# Patient Record
Sex: FEMALE | Race: WHITE | NOT HISPANIC OR LATINO | URBAN - METROPOLITAN AREA
[De-identification: names, ages, dates, MRNs, and addresses within clinical notes are randomized per-mention and may not be internally consistent; named-entity substitution may affect disease eponyms.]

---

## 2017-08-09 ENCOUNTER — INPATIENT (INPATIENT)
Facility: HOSPITAL | Age: 82
LOS: 7 days | Discharge: HOME HEALTH | DRG: 291 | End: 2017-08-17
Attending: INTERNAL MEDICINE | Admitting: EMERGENCY MEDICINE
Payer: MEDICARE

## 2017-08-09 VITALS
SYSTOLIC BLOOD PRESSURE: 150 MMHG | DIASTOLIC BLOOD PRESSURE: 70 MMHG | TEMPERATURE: 99 F | HEART RATE: 86 BPM | OXYGEN SATURATION: 98 % | RESPIRATION RATE: 17 BRPM

## 2017-08-09 DIAGNOSIS — E87.1 HYPO-OSMOLALITY AND HYPONATREMIA: ICD-10-CM

## 2017-08-09 PROCEDURE — 71010: CPT | Mod: 26

## 2017-08-09 PROCEDURE — 99285 EMERGENCY DEPT VISIT HI MDM: CPT

## 2017-08-09 RX ORDER — SULFASALAZINE 500 MG
500 TABLET ORAL
Qty: 0 | Refills: 0 | Status: DISCONTINUED | OUTPATIENT
Start: 2017-08-09 | End: 2017-08-17

## 2017-08-09 RX ORDER — BUDESONIDE, MICRONIZED 100 %
0.5 POWDER (GRAM) MISCELLANEOUS
Qty: 0 | Refills: 0 | Status: DISCONTINUED | OUTPATIENT
Start: 2017-08-09 | End: 2017-08-10

## 2017-08-09 RX ORDER — SIMVASTATIN 20 MG/1
20 TABLET, FILM COATED ORAL AT BEDTIME
Qty: 0 | Refills: 0 | Status: DISCONTINUED | OUTPATIENT
Start: 2017-08-09 | End: 2017-08-10

## 2017-08-09 RX ORDER — OXYCODONE HYDROCHLORIDE 5 MG/1
15 TABLET ORAL ONCE
Qty: 0 | Refills: 0 | Status: DISCONTINUED | OUTPATIENT
Start: 2017-08-09 | End: 2017-08-10

## 2017-08-09 RX ORDER — DORZOLAMIDE HYDROCHLORIDE TIMOLOL MALEATE 20; 5 MG/ML; MG/ML
1 SOLUTION/ DROPS OPHTHALMIC
Qty: 0 | Refills: 0 | Status: DISCONTINUED | OUTPATIENT
Start: 2017-08-09 | End: 2017-08-10

## 2017-08-09 RX ORDER — GABAPENTIN 400 MG/1
300 CAPSULE ORAL
Qty: 0 | Refills: 0 | Status: DISCONTINUED | OUTPATIENT
Start: 2017-08-09 | End: 2017-08-10

## 2017-08-09 RX ORDER — FUROSEMIDE 40 MG
20 TABLET ORAL ONCE
Qty: 0 | Refills: 0 | Status: DISCONTINUED | OUTPATIENT
Start: 2017-08-09 | End: 2017-08-10

## 2017-08-09 RX ORDER — METOPROLOL TARTRATE 50 MG
25 TABLET ORAL
Qty: 0 | Refills: 0 | Status: DISCONTINUED | OUTPATIENT
Start: 2017-08-09 | End: 2017-08-10

## 2017-08-09 RX ORDER — IPRATROPIUM/ALBUTEROL SULFATE 18-103MCG
3 AEROSOL WITH ADAPTER (GRAM) INHALATION ONCE
Qty: 0 | Refills: 0 | Status: DISCONTINUED | OUTPATIENT
Start: 2017-08-09 | End: 2017-08-10

## 2017-08-09 RX ORDER — FERROUS SULFATE 325(65) MG
325 TABLET ORAL DAILY
Qty: 0 | Refills: 0 | Status: DISCONTINUED | OUTPATIENT
Start: 2017-08-09 | End: 2017-08-10

## 2017-08-09 RX ORDER — LATANOPROST 0.05 MG/ML
1 SOLUTION/ DROPS OPHTHALMIC; TOPICAL AT BEDTIME
Qty: 0 | Refills: 0 | Status: DISCONTINUED | OUTPATIENT
Start: 2017-08-09 | End: 2017-08-17

## 2017-08-09 RX ORDER — ALPRAZOLAM 0.25 MG
0.12 TABLET ORAL EVERY 8 HOURS
Qty: 0 | Refills: 0 | Status: DISCONTINUED | OUTPATIENT
Start: 2017-08-09 | End: 2017-08-10

## 2017-08-09 RX ORDER — FOLIC ACID 0.8 MG
1 TABLET ORAL DAILY
Qty: 0 | Refills: 0 | Status: DISCONTINUED | OUTPATIENT
Start: 2017-08-09 | End: 2017-08-10

## 2017-08-09 RX ORDER — CITALOPRAM 10 MG/1
10 TABLET, FILM COATED ORAL DAILY
Qty: 0 | Refills: 0 | Status: DISCONTINUED | OUTPATIENT
Start: 2017-08-09 | End: 2017-08-10

## 2017-08-09 RX ORDER — LEVOTHYROXINE SODIUM 125 MCG
50 TABLET ORAL DAILY
Qty: 0 | Refills: 0 | Status: DISCONTINUED | OUTPATIENT
Start: 2017-08-09 | End: 2017-08-10

## 2017-08-09 RX ORDER — FUROSEMIDE 40 MG
20 TABLET ORAL
Qty: 0 | Refills: 0 | Status: DISCONTINUED | OUTPATIENT
Start: 2017-08-09 | End: 2017-08-10

## 2017-08-09 RX ORDER — BRIMONIDINE TARTRATE 2 MG/MG
1 SOLUTION/ DROPS OPHTHALMIC
Qty: 0 | Refills: 0 | Status: DISCONTINUED | OUTPATIENT
Start: 2017-08-09 | End: 2017-08-10

## 2017-08-09 RX ADMIN — Medication 20 MILLIGRAM(S): at 22:27

## 2017-08-09 NOTE — H&P ADULT - PMH
Arthritis    CHF (congestive heart failure)    Cholecystitis, acute    Crohn's Disease    Crohn's disease    Diabetes Mellitus    Diabetes mellitus    History of Spinal Stenosis    HTN (Hypertension)    Hypothyroidism    Pulmonary HTN    Total knee replacement status

## 2017-08-09 NOTE — H&P ADULT - HISTORY OF PRESENT ILLNESS
HPI::Patient is a 84y old  Female who presents with a chief complaint of hyponatremia/low platelets. no chest pain,+ sob/underwood/pnd/ortopnea  pt stopped her lasix few days ago. Seen by pmd was sent to er due to platelets of 50.no bleeding.      Chief complain platelets/sob    PAST MEDICAL & SURGICAL HISTORY:  CHF (congestive heart failure)  Pulmonary HTN  Cholecystitis, acute  Total knee replacement status  Hypothyroidism  Diabetes mellitus  Arthritis  Crohn's disease  Diabetes Mellitus  HTN (Hypertension)  History of Spinal Stenosis  Crohn's Disease  S/P cataract surgery: bilateral - R 2007 / L 2012  S/P cholecystectomy  H/O shoulder surgery: R rotator cuff repair  S/P Knee Replacement: 1985  Knee Replacement: right total knee replacement 2005      MEDICATIONS  (STANDING):    MEDICATIONS  (PRN):      FAMILY HISTORY:      SOCIAL HISTORY:    [ ] Non-smoker  [ ] Smoker  [ ] Alcohol    Allergies    No Known Allergies    Intolerances    	    REVIEW OF SYSTEMS:  CONSTITUTIONAL: No fever,+ weight loss,+ fatigue  EYES: No eye pain, visual disturbances, or discharge  ENT:  No difficulty hearing, tinnitus, vertigo; No sinus or throat pain  NECK: No pain or stiffness  RESPIRATORY: No cough, wheezing, chills or hemoptysis; + Shortness of Breath  CARDIOVASCULAR: No chest pain, palpitations, passing out, dizziness, or leg swelling  GASTROINTESTINAL: No abdominal or epigastric pain. No nausea, vomiting, or hematemesis; No diarrhea or constipation. No melena or hematochezia.  GENITOURINARY: No dysuria, frequency, hematuria, or incontinence  NEUROLOGICAL: No headaches, memory loss, loss of strength, numbness, or tremors  SKIN: No itching, burning, rashes, or lesions   LYMPH Nodes: No enlarged glands  ENDOCRINE: No heat or cold intolerance; No hair loss  MUSCULOSKELETAL: No joint pain or swelling; No muscle, back, or extremity pain  PSYCHIATRIC: No depression, anxiety, mood swings, or difficulty sleeping  HEME/LYMPH: No easy bruising, or bleeding gums  ALLERGY AND IMMUNOLOGIC: No hives or eczema	    [ ] All others negative	  [ ] Unable to obtain    PHYSICAL EXAM:  T(C): --  HR: --  BP: --  RR: --  SpO2: --  Wt(kg): --  I&O's Summary      Appearance: Normal	  HEENT:   Normal oral mucosa, PERRL, EOMI	  Lymphatic: No lymphadenopathy  Cardiovascular: Normal S1 S2, No JVD, Systolic murmurs, + edema  Respiratory: decrease bs b/l  Psychiatry: A & O x 3, Mood & affect appropriate  Gastrointestinal:  Soft, Non-tender, + BS	  Skin: No rashes, No ecchymoses, No cyanosis	  Neurologic: Non-focal  Extremities: Normal range of motion, No clubbing + edema  Vascular: Peripheral pulses palpable 2+ bilaterally    TELEMETRY: 	    ECG:  	  RADIOLOGY:  OTHER: 	  	  LABS:	 	    CARDIAC MARKERS:  CARDIAC MARKERS ( 09 Aug 2017 16:30 )  x     / <0.01 ng/mL / x     / x     / x           Xray Chest 1 View AP -PORTABLE-Routine (07.10.15 @ 16:56) >  Cardiomediastinal silhouette is unchanged.  No focal consolidation.  There are no pleural effusions.  There is no evidence of pneumothorax.    IMPRESSION: No focal consolidation.    5.7   )-----------( CLUMPED    ( 09 Aug 2017 16:26 )             38.6     08-09    124<L>  |  86<L>  |  8   ----------------------------<  119<H>  4.7   |  27  |  0.70    Ca    8.9      09 Aug 2017 16:30    TPro  6.8  /  Alb  3.6  /  TBili  0.5  /  DBili  x   /  AST  21  /  ALT  19  /  AlkPhos  82  08-09    proBNP: Serum Pro-Brain Natriuretic Peptide: 1987 pg/mL (08-09 @ 16:30)    Lipid Profile:   HgA1c:   TSH:      < from: 12 Lead ECG (06.12.15 @ 11:39) >  NORMAL SINUS RHYTHM  ST & T WAVE ABNORMALITY, CONSIDER INFERIOR ISCHEMIA  ST & T WAVE ABNORMALITY, CONSIDER ANTEROLATERAL ISCHEMIA  ABNORMAL ECG    < from: Transthoracic Echocardiogram (04.22.15 @ 08:03) >  1. Increased relative wall thickness with normal left  ventricular mass index, consistent with concentric left  ventricular remodeling.  2. Normal left ventricular systolic function. No segmental  wall motion abnormalities. Flattening of the  interventricular septum in both systole and diastole is  consistent with right ventricular pressure overload.  3. Decreased right ventricular systolic function.  Right  ventricular enlargement

## 2017-08-09 NOTE — H&P ADULT - ASSESSMENT
pt with low platelets now platelets with 100 with no bleeding hold asa  hold heparin  repeat cbc hem consult  R sided chf due to pulmonary htn   continue cardiac meds,.  repeat echo  iv lasix  hyponatremia due fluid overload since pt did take her lasix

## 2017-08-09 NOTE — H&P ADULT - PSH
H/O shoulder surgery  R rotator cuff repair  Knee Replacement  right total knee replacement 2005  S/P cataract surgery  bilateral - R 2007 / L 2012  S/P cholecystectomy    S/P Knee Replacement  1985

## 2017-08-10 ENCOUNTER — TRANSCRIPTION ENCOUNTER (OUTPATIENT)
Age: 82
End: 2017-08-10

## 2017-08-10 LAB
ALBUMIN SERPL ELPH-MCNC: 3.1 G/DL — LOW (ref 3.3–5)
ALP SERPL-CCNC: 75 U/L — SIGNIFICANT CHANGE UP (ref 40–120)
ALT FLD-CCNC: 18 U/L RC — SIGNIFICANT CHANGE UP (ref 10–45)
ANION GAP SERPL CALC-SCNC: 11 MMOL/L — SIGNIFICANT CHANGE UP (ref 5–17)
ANION GAP SERPL CALC-SCNC: 8 MMOL/L — SIGNIFICANT CHANGE UP (ref 5–17)
AST SERPL-CCNC: 24 U/L — SIGNIFICANT CHANGE UP (ref 10–40)
BILIRUB SERPL-MCNC: 0.4 MG/DL — SIGNIFICANT CHANGE UP (ref 0.2–1.2)
BUN SERPL-MCNC: 9 MG/DL — SIGNIFICANT CHANGE UP (ref 7–23)
BUN SERPL-MCNC: 9 MG/DL — SIGNIFICANT CHANGE UP (ref 7–23)
CALCIUM SERPL-MCNC: 8.7 MG/DL — SIGNIFICANT CHANGE UP (ref 8.4–10.5)
CALCIUM SERPL-MCNC: 8.9 MG/DL — SIGNIFICANT CHANGE UP (ref 8.4–10.5)
CHLORIDE SERPL-SCNC: 85 MMOL/L — LOW (ref 96–108)
CHLORIDE SERPL-SCNC: 86 MMOL/L — LOW (ref 96–108)
CO2 SERPL-SCNC: 30 MMOL/L — SIGNIFICANT CHANGE UP (ref 22–31)
CO2 SERPL-SCNC: 33 MMOL/L — HIGH (ref 22–31)
CREAT SERPL-MCNC: 0.73 MG/DL — SIGNIFICANT CHANGE UP (ref 0.5–1.3)
CREAT SERPL-MCNC: 0.77 MG/DL — SIGNIFICANT CHANGE UP (ref 0.5–1.3)
GLUCOSE SERPL-MCNC: 115 MG/DL — HIGH (ref 70–99)
GLUCOSE SERPL-MCNC: 138 MG/DL — HIGH (ref 70–99)
HCT VFR BLD CALC: 37.1 % — SIGNIFICANT CHANGE UP (ref 34.5–45)
HGB BLD-MCNC: 12.9 G/DL — SIGNIFICANT CHANGE UP (ref 11.5–15.5)
MCHC RBC-ENTMCNC: 33.1 PG — SIGNIFICANT CHANGE UP (ref 27–34)
MCHC RBC-ENTMCNC: 34.9 GM/DL — SIGNIFICANT CHANGE UP (ref 32–36)
MCV RBC AUTO: 94.9 FL — SIGNIFICANT CHANGE UP (ref 80–100)
OSMOLALITY SERPL: 265 MOS/KG — LOW (ref 275–300)
PLATELET # BLD AUTO: 117 K/UL — LOW (ref 150–400)
POTASSIUM SERPL-MCNC: 4.1 MMOL/L — SIGNIFICANT CHANGE UP (ref 3.5–5.3)
POTASSIUM SERPL-MCNC: 4.6 MMOL/L — SIGNIFICANT CHANGE UP (ref 3.5–5.3)
POTASSIUM SERPL-SCNC: 4.1 MMOL/L — SIGNIFICANT CHANGE UP (ref 3.5–5.3)
POTASSIUM SERPL-SCNC: 4.6 MMOL/L — SIGNIFICANT CHANGE UP (ref 3.5–5.3)
PROT SERPL-MCNC: 6.3 G/DL — SIGNIFICANT CHANGE UP (ref 6–8.3)
RBC # BLD: 3.91 M/UL — SIGNIFICANT CHANGE UP (ref 3.8–5.2)
RBC # FLD: 11.7 % — SIGNIFICANT CHANGE UP (ref 10.3–14.5)
SODIUM SERPL-SCNC: 126 MMOL/L — LOW (ref 135–145)
SODIUM SERPL-SCNC: 127 MMOL/L — LOW (ref 135–145)
TSH SERPL-MCNC: 1.4 UIU/ML — SIGNIFICANT CHANGE UP (ref 0.27–4.2)
URATE SERPL-MCNC: 5.4 MG/DL — SIGNIFICANT CHANGE UP (ref 2.5–7)
WBC # BLD: 7.2 K/UL — SIGNIFICANT CHANGE UP (ref 3.8–10.5)
WBC # FLD AUTO: 7.2 K/UL — SIGNIFICANT CHANGE UP (ref 3.8–10.5)

## 2017-08-10 RX ORDER — FUROSEMIDE 40 MG
20 TABLET ORAL
Qty: 0 | Refills: 0 | Status: DISCONTINUED | OUTPATIENT
Start: 2017-08-10 | End: 2017-08-10

## 2017-08-10 RX ORDER — ALPRAZOLAM 0.25 MG
0.12 TABLET ORAL EVERY 8 HOURS
Qty: 0 | Refills: 0 | Status: DISCONTINUED | OUTPATIENT
Start: 2017-08-10 | End: 2017-08-10

## 2017-08-10 RX ORDER — FERROUS SULFATE 325(65) MG
325 TABLET ORAL DAILY
Qty: 0 | Refills: 0 | Status: DISCONTINUED | OUTPATIENT
Start: 2017-08-10 | End: 2017-08-17

## 2017-08-10 RX ORDER — GABAPENTIN 400 MG/1
300 CAPSULE ORAL
Qty: 0 | Refills: 0 | Status: DISCONTINUED | OUTPATIENT
Start: 2017-08-10 | End: 2017-08-14

## 2017-08-10 RX ORDER — BRIMONIDINE TARTRATE 2 MG/MG
1 SOLUTION/ DROPS OPHTHALMIC
Qty: 0 | Refills: 0 | Status: DISCONTINUED | OUTPATIENT
Start: 2017-08-10 | End: 2017-08-17

## 2017-08-10 RX ORDER — BUDESONIDE, MICRONIZED 100 %
0.5 POWDER (GRAM) MISCELLANEOUS
Qty: 0 | Refills: 0 | Status: DISCONTINUED | OUTPATIENT
Start: 2017-08-10 | End: 2017-08-17

## 2017-08-10 RX ORDER — ONDANSETRON 8 MG/1
4 TABLET, FILM COATED ORAL ONCE
Qty: 0 | Refills: 0 | Status: DISCONTINUED | OUTPATIENT
Start: 2017-08-10 | End: 2017-08-10

## 2017-08-10 RX ORDER — SIMVASTATIN 20 MG/1
20 TABLET, FILM COATED ORAL AT BEDTIME
Qty: 0 | Refills: 0 | Status: DISCONTINUED | OUTPATIENT
Start: 2017-08-10 | End: 2017-08-17

## 2017-08-10 RX ORDER — METOPROLOL TARTRATE 50 MG
25 TABLET ORAL
Qty: 0 | Refills: 0 | Status: DISCONTINUED | OUTPATIENT
Start: 2017-08-10 | End: 2017-08-17

## 2017-08-10 RX ORDER — HEPARIN SODIUM 5000 [USP'U]/ML
5000 INJECTION INTRAVENOUS; SUBCUTANEOUS EVERY 12 HOURS
Qty: 0 | Refills: 0 | Status: DISCONTINUED | OUTPATIENT
Start: 2017-08-10 | End: 2017-08-17

## 2017-08-10 RX ORDER — FUROSEMIDE 40 MG
20 TABLET ORAL ONCE
Qty: 0 | Refills: 0 | Status: COMPLETED | OUTPATIENT
Start: 2017-08-10 | End: 2017-08-09

## 2017-08-10 RX ORDER — DILTIAZEM HCL 120 MG
120 CAPSULE, EXT RELEASE 24 HR ORAL DAILY
Qty: 0 | Refills: 0 | Status: DISCONTINUED | OUTPATIENT
Start: 2017-08-10 | End: 2017-08-13

## 2017-08-10 RX ORDER — IPRATROPIUM/ALBUTEROL SULFATE 18-103MCG
3 AEROSOL WITH ADAPTER (GRAM) INHALATION ONCE
Qty: 0 | Refills: 0 | Status: DISCONTINUED | OUTPATIENT
Start: 2017-08-10 | End: 2017-08-17

## 2017-08-10 RX ORDER — LEVOTHYROXINE SODIUM 125 MCG
50 TABLET ORAL DAILY
Qty: 0 | Refills: 0 | Status: DISCONTINUED | OUTPATIENT
Start: 2017-08-10 | End: 2017-08-17

## 2017-08-10 RX ORDER — FOLIC ACID 0.8 MG
1 TABLET ORAL DAILY
Qty: 0 | Refills: 0 | Status: DISCONTINUED | OUTPATIENT
Start: 2017-08-10 | End: 2017-08-17

## 2017-08-10 RX ORDER — DORZOLAMIDE HYDROCHLORIDE TIMOLOL MALEATE 20; 5 MG/ML; MG/ML
1 SOLUTION/ DROPS OPHTHALMIC
Qty: 0 | Refills: 0 | Status: DISCONTINUED | OUTPATIENT
Start: 2017-08-10 | End: 2017-08-17

## 2017-08-10 RX ORDER — CITALOPRAM 10 MG/1
10 TABLET, FILM COATED ORAL DAILY
Qty: 0 | Refills: 0 | Status: DISCONTINUED | OUTPATIENT
Start: 2017-08-10 | End: 2017-08-11

## 2017-08-10 RX ADMIN — OXYCODONE HYDROCHLORIDE 15 MILLIGRAM(S): 5 TABLET ORAL at 02:59

## 2017-08-10 RX ADMIN — DORZOLAMIDE HYDROCHLORIDE TIMOLOL MALEATE 1 DROP(S): 20; 5 SOLUTION/ DROPS OPHTHALMIC at 17:36

## 2017-08-10 RX ADMIN — HEPARIN SODIUM 5000 UNIT(S): 5000 INJECTION INTRAVENOUS; SUBCUTANEOUS at 17:36

## 2017-08-10 RX ADMIN — Medication 0.5 MILLIGRAM(S): at 17:36

## 2017-08-10 RX ADMIN — Medication 500 MILLIGRAM(S): at 06:14

## 2017-08-10 RX ADMIN — OXYCODONE HYDROCHLORIDE 15 MILLIGRAM(S): 5 TABLET ORAL at 02:28

## 2017-08-10 RX ADMIN — BRIMONIDINE TARTRATE 1 DROP(S): 2 SOLUTION/ DROPS OPHTHALMIC at 06:15

## 2017-08-10 RX ADMIN — Medication 50 MICROGRAM(S): at 06:14

## 2017-08-10 RX ADMIN — GABAPENTIN 300 MILLIGRAM(S): 400 CAPSULE ORAL at 06:14

## 2017-08-10 RX ADMIN — Medication 1 MILLIGRAM(S): at 12:42

## 2017-08-10 RX ADMIN — Medication 500 MILLIGRAM(S): at 17:37

## 2017-08-10 RX ADMIN — CITALOPRAM 10 MILLIGRAM(S): 10 TABLET, FILM COATED ORAL at 12:42

## 2017-08-10 RX ADMIN — LATANOPROST 1 DROP(S): 0.05 SOLUTION/ DROPS OPHTHALMIC; TOPICAL at 21:44

## 2017-08-10 RX ADMIN — Medication 20 MILLIGRAM(S): at 06:14

## 2017-08-10 RX ADMIN — Medication 325 MILLIGRAM(S): at 12:42

## 2017-08-10 RX ADMIN — Medication 25 MILLIGRAM(S): at 06:14

## 2017-08-10 RX ADMIN — Medication 0.5 MILLIGRAM(S): at 06:16

## 2017-08-10 RX ADMIN — BRIMONIDINE TARTRATE 1 DROP(S): 2 SOLUTION/ DROPS OPHTHALMIC at 17:36

## 2017-08-10 RX ADMIN — DORZOLAMIDE HYDROCHLORIDE TIMOLOL MALEATE 1 DROP(S): 20; 5 SOLUTION/ DROPS OPHTHALMIC at 06:15

## 2017-08-10 RX ADMIN — SIMVASTATIN 20 MILLIGRAM(S): 20 TABLET, FILM COATED ORAL at 21:43

## 2017-08-10 RX ADMIN — GABAPENTIN 300 MILLIGRAM(S): 400 CAPSULE ORAL at 17:36

## 2017-08-10 RX ADMIN — Medication 120 MILLIGRAM(S): at 12:42

## 2017-08-10 RX ADMIN — Medication 25 MILLIGRAM(S): at 17:36

## 2017-08-10 NOTE — DISCHARGE NOTE ADULT - MEDICATION SUMMARY - MEDICATIONS TO STOP TAKING
I will STOP taking the medications listed below when I get home from the hospital:    Cardizem  mg/24 hours oral capsule, extended release  -- 1 cap(s) by mouth once a day    VESIcare 10 mg oral tablet  -- 1 tab(s) by mouth once a day (at bedtime)    gabapentin 300 mg oral capsule  -- 1 cap(s) by mouth 2 times a day    citalopram 10 mg oral tablet  -- 1 tab(s) by mouth once a day

## 2017-08-10 NOTE — DISCHARGE NOTE ADULT - MEDICATION SUMMARY - MEDICATIONS TO TAKE
I will START or STAY ON the medications listed below when I get home from the hospital:    sulfaSALAzine 500 mg oral tablet  -- 1 tab(s) by mouth 2 times a day  -- Indication: For Crohn's disease    Pulmicort Respules 0.5 mg/2 mL inhalation suspension  -- 1 dose(s) inhaled 2 times a day  -- Indication: For Shortness of breath    aspirin 81 mg oral delayed release tablet  -- 1 tab(s) by mouth once a day  -- Indication: For protect from clot formation    oxyMORphone 10 mg oral tablet, extended release  -- 1 tab(s) by mouth every 12 hours  -- Indication: For chronic pain from spinal stensosis    simvastatin 20 mg oral tablet  -- 1 tab(s) by mouth once a day (at bedtime)  -- Indication: For Hgh cholesterol    Xanax  -- 0.125 milligram(s) by mouth every 8 hours, As Needed - for anxiety  -- Indication: For anxiety    metoprolol tartrate 25 mg oral tablet  -- 1 tab(s) by mouth 2 times a day  -- Indication: For High blood pressure, congestive heart failure    DuoNeb 0.5 mg-2.5 mg/3 mL inhalation solution  -- 3 milliliter(s) inhaled 4 times a day, As Needed  -- Indication: For Shortness of breath    furosemide 20 mg oral tablet  -- 1 tab(s) by mouth once a day  -- Indication: For congestive heart failure    ferrous sulfate (as elemental iron) 45 mg oral tablet, extended release  -- 1 tab(s) by mouth 2 times a day  -- Indication: For Supplement    Alphagan 0.2% ophthalmic solution  -- 1 drop(s) to each affected eye 2 times a day  -- Indication: For dry eyes    Cosopt  -- 1 drop(s) to each affected eye 2 times a day  -- Indication: For protect from increased eye pressure    Lumigan 0.01% ophthalmic solution  -- 1 drop(s) to each affected eye once a day (in the evening)  -- Indication: For protect from increased eye pressure    levothyroxine 50 mcg (0.05 mg) oral tablet  -- 1 tab(s) by mouth once a day  -- Indication: For Hypothyroidism    folic acid 1 mg oral tablet  -- 1 tab(s) by mouth once a day  -- Indication: For Supplement

## 2017-08-10 NOTE — DISCHARGE NOTE ADULT - NS AS DC HF EDUCATION INSTRUCTIONS
Low salt diet/Call Primary Care Provider for follow-up after discharge/Report weight gain of 2 or more pounds in one day or 3 or more pounds in one week, worsening shortness of breath, fatigue, weakness, increased swelling of hands and feet to primary care provider/Activities as tolerated/Monitor Weight Daily

## 2017-08-10 NOTE — DISCHARGE NOTE ADULT - NSFTFHOME1RD_GEN_ALL_CORE
Pain greater than 7 on scale of 10 on ambulation/Shortness of breath with minimal ambulation/Fall risk

## 2017-08-10 NOTE — DISCHARGE NOTE ADULT - NSFTFSERV1RD_GEN_ALL_CORE
medication teaching and assessment/rehabilitation services/observation and assessment/teaching and training

## 2017-08-10 NOTE — DISCHARGE NOTE ADULT - MEDICATION SUMMARY - MEDICATIONS TO CHANGE
I will SWITCH the dose or number of times a day I take the medications listed below when I get home from the hospital:    metoprolol  -- 12.5 milligram(s) by mouth every 12 hours    oxyCODONE 15 mg oral tablet  -- 1 tab(s) by mouth every 8 hours, As needed, moderate to severe pain    oxymorphone 20 mg oral tablet, extended release  -- 1 tab(s) by mouth every 12 hours

## 2017-08-10 NOTE — DISCHARGE NOTE ADULT - PATIENT PORTAL LINK FT
“You can access the [facility] Patient Portal, offered by [facility], by registering with the following website: [email or web address]”

## 2017-08-10 NOTE — DISCHARGE NOTE ADULT - HOME CARE AGENCY
[facility] # [phone]. VN TO CALL TO SCHEDULE VISITS DAY AFTER HOSPITAL DISCHARGE.

## 2017-08-10 NOTE — DISCHARGE NOTE ADULT - CARE PROVIDER_API CALL
Tony Potts), Critical Care Medicine; Internal Medicine; Pulmonary Disease; Sleep Medicine  [address]  [address]  [place] [address]  Phone: [phone]  Fax: [phone]    Rajendra Navas), Anesthesiology; Pain Medicine  [address]  [place] [address]  Phone: [phone]  Fax: [phone]    Fred Simmons), Gastroenterology; Internal Medicine  [address] [place] [address]  Phone: [phone]  Fax: [phone]

## 2017-08-10 NOTE — DISCHARGE NOTE ADULT - HOSPITAL COURSE
To be completed by attending. Pt non compliant with meds adm with vol overload and low sodium.  Followed and tx by renal.  Na stabilized.  Diuresed.  DC in stable condition.

## 2017-08-10 NOTE — DISCHARGE NOTE ADULT - PLAN OF CARE
Improved. Continue with oxygen support and respiratory treatments as prescribed by your doctor.  Follow-up with your primary care physician and pulmonologist in 1 week. Call for appointment. Follow-up with your primary care physician in 1 week . Call for appointment. Weigh yourself daily.  If you gain 3lbs in 3 days, or 5lbs in a week call your Health Care Provider.  Do not eat or drink foods containing more than 2000mg of salt (sodium) in your diet every day.  Call your Health Care Provider if you have any swelling or increased swelling in your feet, ankles, and/or stomach.  Take all of your medication as directed.  If you become dizzy call your Health Care Provider. You completed a course of antibiotics.   Drink enough water and fluids to keep your urine clear or pale yellow.  Avoid caffeine, tea, and carbonated beverages. They tend to irritate your bladder.  Empty your bladder often. Avoid holding urine for long periods of time.  After a bowel movement, women should cleanse from front to back. Use each tissue only once.  SEEK MEDICAL CARE IF:  You have back pain.  You develop a fever.  Your symptoms do not begin to resolve within 3 days.  SEEK IMMEDIATE MEDICAL CARE IF:  You have severe back pain or lower abdominal pain.  You develop chills.  You have nausea or vomiting.  You have continued burning or discomfort with urination. Continue with medication as prescribed by your doctor.  Follow-up with your primary care physician and gastroenterologist in 1 to 2 weeks. Call for appointment. you do not make enough thyroid hormone  signs & symptoms of low levels of thyroid hormone - tired, getting cold easily, coarse or thin hair, constipation, shortness of breath, swelling, irregular periods  your doctor will do thyroid hormone blood tests at least once a year to monitor if medication dose is adequate  take your thyroid medicine as directed by your doctor & on empty stomach Continue with eye drops as prescribed by your doctor.  Follow-up with your primary care physician and or ophthalmologist.

## 2017-08-10 NOTE — DISCHARGE NOTE ADULT - SECONDARY DIAGNOSIS.
Hyponatremia CHF (congestive heart failure) Urinary tract infection Crohn's disease Hypothyroidism S/P cataract surgery

## 2017-08-10 NOTE — DISCHARGE NOTE ADULT - ADDITIONAL INSTRUCTIONS
Follow-up with your primary care physician and  pulmonologist in 1 week. Call for appointment.  Follow-up with your Chronic Pain Physician in 1 week.

## 2017-08-10 NOTE — DISCHARGE NOTE ADULT - CARE PROVIDERS DIRECT ADDRESSES
,DirectAddress_Unknown,DirectAddress_Unknown,[email or web address]

## 2017-08-10 NOTE — DISCHARGE NOTE ADULT - CARE PLAN
Principal Discharge DX:	Shortness of breath  Secondary Diagnosis:	Hyponatremia  Secondary Diagnosis:	CHF (congestive heart failure)  Secondary Diagnosis:	Urinary tract infection  Secondary Diagnosis:	Crohn's disease Principal Discharge DX:	Shortness of breath  Goal:	Improved.  Instructions for follow-up, activity and diet:	Continue with oxygen support and respiratory treatments as prescribed by your doctor.  Follow-up with your primary care physician and pulmonologist in 1 week. Call for appointment.  Secondary Diagnosis:	Hyponatremia  Goal:	Improved.  Instructions for follow-up, activity and diet:	Follow-up with your primary care physician in 1 week . Call for appointment.  Secondary Diagnosis:	CHF (congestive heart failure)  Instructions for follow-up, activity and diet:	Weigh yourself daily.  If you gain 3lbs in 3 days, or 5lbs in a week call your Health Care Provider.  Do not eat or drink foods containing more than 2000mg of salt (sodium) in your diet every day.  Call your Health Care Provider if you have any swelling or increased swelling in your feet, ankles, and/or stomach.  Take all of your medication as directed.  If you become dizzy call your Health Care Provider.  Secondary Diagnosis:	Urinary tract infection  Instructions for follow-up, activity and diet:	You completed a course of antibiotics.   Drink enough water and fluids to keep your urine clear or pale yellow.  Avoid caffeine, tea, and carbonated beverages. They tend to irritate your bladder.  Empty your bladder often. Avoid holding urine for long periods of time.  After a bowel movement, women should cleanse from front to back. Use each tissue only once.  SEEK MEDICAL CARE IF:  You have back pain.  You develop a fever.  Your symptoms do not begin to resolve within 3 days.  SEEK IMMEDIATE MEDICAL CARE IF:  You have severe back pain or lower abdominal pain.  You develop chills.  You have nausea or vomiting.  You have continued burning or discomfort with urination.  Secondary Diagnosis:	Crohn's disease  Instructions for follow-up, activity and diet:	Continue with medication as prescribed by your doctor.  Follow-up with your primary care physician and gastroenterologist in 1 to 2 weeks. Call for appointment.  Secondary Diagnosis:	Hypothyroidism  Instructions for follow-up, activity and diet:	you do not make enough thyroid hormone  signs & symptoms of low levels of thyroid hormone - tired, getting cold easily, coarse or thin hair, constipation, shortness of breath, swelling, irregular periods  your doctor will do thyroid hormone blood tests at least once a year to monitor if medication dose is adequate  take your thyroid medicine as directed by your doctor & on empty stomach  Secondary Diagnosis:	S/P cataract surgery  Instructions for follow-up, activity and diet:	Continue with eye drops as prescribed by your doctor.  Follow-up with your primary care physician and or ophthalmologist.

## 2017-08-11 LAB
ANION GAP SERPL CALC-SCNC: 10 MMOL/L — SIGNIFICANT CHANGE UP (ref 5–17)
ANION GAP SERPL CALC-SCNC: 8 MMOL/L — SIGNIFICANT CHANGE UP (ref 5–17)
BUN SERPL-MCNC: 13 MG/DL — SIGNIFICANT CHANGE UP (ref 7–23)
BUN SERPL-MCNC: 15 MG/DL — SIGNIFICANT CHANGE UP (ref 7–23)
CALCIUM SERPL-MCNC: 8.8 MG/DL — SIGNIFICANT CHANGE UP (ref 8.4–10.5)
CALCIUM SERPL-MCNC: 9.3 MG/DL — SIGNIFICANT CHANGE UP (ref 8.4–10.5)
CHLORIDE SERPL-SCNC: 81 MMOL/L — LOW (ref 96–108)
CHLORIDE SERPL-SCNC: 83 MMOL/L — LOW (ref 96–108)
CHLORIDE UR-SCNC: 37 MMOL/L — SIGNIFICANT CHANGE UP
CHLORIDE UR-SCNC: 94 MMOL/L — SIGNIFICANT CHANGE UP
CO2 SERPL-SCNC: 32 MMOL/L — HIGH (ref 22–31)
CO2 SERPL-SCNC: 35 MMOL/L — HIGH (ref 22–31)
CREAT ?TM UR-MCNC: 45 MG/DL — SIGNIFICANT CHANGE UP
CREAT SERPL-MCNC: 1.06 MG/DL — SIGNIFICANT CHANGE UP (ref 0.5–1.3)
CREAT SERPL-MCNC: 1.13 MG/DL — SIGNIFICANT CHANGE UP (ref 0.5–1.3)
GLUCOSE SERPL-MCNC: 142 MG/DL — HIGH (ref 70–99)
GLUCOSE SERPL-MCNC: 97 MG/DL — SIGNIFICANT CHANGE UP (ref 70–99)
HBA1C BLD-MCNC: 5.7 % — HIGH (ref 4–5.6)
HCT VFR BLD CALC: 34.4 % — LOW (ref 34.5–45)
HGB BLD-MCNC: 11.8 G/DL — SIGNIFICANT CHANGE UP (ref 11.5–15.5)
MCHC RBC-ENTMCNC: 31.6 PG — SIGNIFICANT CHANGE UP (ref 27–34)
MCHC RBC-ENTMCNC: 34.3 GM/DL — SIGNIFICANT CHANGE UP (ref 32–36)
MCV RBC AUTO: 92 FL — SIGNIFICANT CHANGE UP (ref 80–100)
OSMOLALITY UR: 259 MOS/KG — SIGNIFICANT CHANGE UP (ref 50–1200)
OSMOLALITY UR: 367 MOS/KG — SIGNIFICANT CHANGE UP (ref 50–1200)
PLATELET # BLD AUTO: 106 K/UL — LOW (ref 150–400)
POTASSIUM SERPL-MCNC: 4.5 MMOL/L — SIGNIFICANT CHANGE UP (ref 3.5–5.3)
POTASSIUM SERPL-MCNC: 4.5 MMOL/L — SIGNIFICANT CHANGE UP (ref 3.5–5.3)
POTASSIUM SERPL-SCNC: 4.5 MMOL/L — SIGNIFICANT CHANGE UP (ref 3.5–5.3)
POTASSIUM SERPL-SCNC: 4.5 MMOL/L — SIGNIFICANT CHANGE UP (ref 3.5–5.3)
POTASSIUM UR-SCNC: 18 MMOL/L — SIGNIFICANT CHANGE UP
POTASSIUM UR-SCNC: 21 MMOL/L — SIGNIFICANT CHANGE UP
RBC # BLD: 3.74 M/UL — LOW (ref 3.8–5.2)
RBC # FLD: 13.2 % — SIGNIFICANT CHANGE UP (ref 10.3–14.5)
SODIUM SERPL-SCNC: 124 MMOL/L — LOW (ref 135–145)
SODIUM SERPL-SCNC: 125 MMOL/L — LOW (ref 135–145)
SODIUM UR-SCNC: 37 MMOL/L — SIGNIFICANT CHANGE UP
SODIUM UR-SCNC: 82 MMOL/L — SIGNIFICANT CHANGE UP
WBC # BLD: 6.41 K/UL — SIGNIFICANT CHANGE UP (ref 3.8–10.5)
WBC # FLD AUTO: 6.41 K/UL — SIGNIFICANT CHANGE UP (ref 3.8–10.5)

## 2017-08-11 RX ORDER — ACETAMINOPHEN 500 MG
650 TABLET ORAL ONCE
Qty: 0 | Refills: 0 | Status: COMPLETED | OUTPATIENT
Start: 2017-08-11 | End: 2017-08-11

## 2017-08-11 RX ORDER — OXYCODONE HYDROCHLORIDE 5 MG/1
5 TABLET ORAL ONCE
Qty: 0 | Refills: 0 | Status: DISCONTINUED | OUTPATIENT
Start: 2017-08-11 | End: 2017-08-11

## 2017-08-11 RX ORDER — SODIUM CHLORIDE 9 MG/ML
1 INJECTION INTRAMUSCULAR; INTRAVENOUS; SUBCUTANEOUS EVERY 4 HOURS
Qty: 0 | Refills: 0 | Status: COMPLETED | OUTPATIENT
Start: 2017-08-11 | End: 2017-08-11

## 2017-08-11 RX ADMIN — Medication 25 MILLIGRAM(S): at 05:56

## 2017-08-11 RX ADMIN — Medication 650 MILLIGRAM(S): at 21:41

## 2017-08-11 RX ADMIN — SIMVASTATIN 20 MILLIGRAM(S): 20 TABLET, FILM COATED ORAL at 21:16

## 2017-08-11 RX ADMIN — DORZOLAMIDE HYDROCHLORIDE TIMOLOL MALEATE 1 DROP(S): 20; 5 SOLUTION/ DROPS OPHTHALMIC at 05:57

## 2017-08-11 RX ADMIN — Medication 0.5 MILLIGRAM(S): at 05:58

## 2017-08-11 RX ADMIN — DORZOLAMIDE HYDROCHLORIDE TIMOLOL MALEATE 1 DROP(S): 20; 5 SOLUTION/ DROPS OPHTHALMIC at 17:05

## 2017-08-11 RX ADMIN — HEPARIN SODIUM 5000 UNIT(S): 5000 INJECTION INTRAVENOUS; SUBCUTANEOUS at 17:05

## 2017-08-11 RX ADMIN — BRIMONIDINE TARTRATE 1 DROP(S): 2 SOLUTION/ DROPS OPHTHALMIC at 05:57

## 2017-08-11 RX ADMIN — Medication 325 MILLIGRAM(S): at 12:47

## 2017-08-11 RX ADMIN — Medication 1 MILLIGRAM(S): at 12:48

## 2017-08-11 RX ADMIN — BRIMONIDINE TARTRATE 1 DROP(S): 2 SOLUTION/ DROPS OPHTHALMIC at 17:05

## 2017-08-11 RX ADMIN — Medication 650 MILLIGRAM(S): at 22:15

## 2017-08-11 RX ADMIN — Medication 500 MILLIGRAM(S): at 17:05

## 2017-08-11 RX ADMIN — Medication 500 MILLIGRAM(S): at 06:22

## 2017-08-11 RX ADMIN — Medication 50 MICROGRAM(S): at 05:56

## 2017-08-11 RX ADMIN — Medication 0.5 MILLIGRAM(S): at 17:05

## 2017-08-11 RX ADMIN — HEPARIN SODIUM 5000 UNIT(S): 5000 INJECTION INTRAVENOUS; SUBCUTANEOUS at 05:58

## 2017-08-11 RX ADMIN — GABAPENTIN 300 MILLIGRAM(S): 400 CAPSULE ORAL at 05:56

## 2017-08-11 RX ADMIN — SODIUM CHLORIDE 1 GRAM(S): 9 INJECTION INTRAMUSCULAR; INTRAVENOUS; SUBCUTANEOUS at 17:05

## 2017-08-11 RX ADMIN — LATANOPROST 1 DROP(S): 0.05 SOLUTION/ DROPS OPHTHALMIC; TOPICAL at 21:16

## 2017-08-11 RX ADMIN — SODIUM CHLORIDE 1 GRAM(S): 9 INJECTION INTRAMUSCULAR; INTRAVENOUS; SUBCUTANEOUS at 12:47

## 2017-08-11 RX ADMIN — GABAPENTIN 300 MILLIGRAM(S): 400 CAPSULE ORAL at 17:05

## 2017-08-11 RX ADMIN — Medication 25 MILLIGRAM(S): at 17:06

## 2017-08-11 RX ADMIN — Medication 120 MILLIGRAM(S): at 05:55

## 2017-08-11 NOTE — PROGRESS NOTE ADULT - ASSESSMENT
PT WITH  HTN, NOW  WITH HYPONATREMIA   SEEN BY RENAL   DUIRETIC    ON HOLD.  FLUID  RESTRICT    BMP IN AM

## 2017-08-11 NOTE — DIETITIAN INITIAL EVALUATION ADULT. - OTHER INFO
Nutrition consult received for CHF and HgbA1c pending. Pt reports good appetite and po intakes of meals, flowsheet reviewed. No GI distress but last BM 2 days ago, will provide stewed prunes and prune juice to help with BM. Pt missing upper and lower teeth with no dentures, but tolerating soft diet texture, denies chewing/swallowing difficulties. NKFA. PTA takes multivitamin. Pt states she following low sodium diet, but does not check daily wt.

## 2017-08-11 NOTE — DIETITIAN INITIAL EVALUATION ADULT. - NS AS NUTRI INTERV MEALS SNACK
Reviewed alternate menu options and menu ordering procedure. Provide food preferences within therapeutic diet when requested. RD to provide stewed prunes and prune juice.

## 2017-08-11 NOTE — DIETITIAN INITIAL EVALUATION ADULT. - PERTINENT LABORATORY DATA
Na 125 [135 - 145], K+ 4.5 [3.5 - 5.3], BUN 13 [7 - 23], Cr 1.06 [0.50 - 1.30], BG 97 [70 - 99], Phos --, Alk Phos --, AST --, ALT --, Mg --, Ca 8.8 [8.4 - 10.5], HbA1c --

## 2017-08-11 NOTE — PROGRESS NOTE ADULT - ASSESSMENT
85 yo F with hyponatremia with hx of chf as well  sodium still low despite lasix initiated  her serum bicarb is rising. pro bnp is less than 2000. cxr 8/9 without signs of chf  therefore hold lasix   sodium chloride 1 g bid and re-evaluate in am   dc SSRI  urine lytes and osoms   d/w cards team

## 2017-08-11 NOTE — DIETITIAN INITIAL EVALUATION ADULT. - ORAL INTAKE PTA
good/HHA usually prepares meals at home, states appetite and po intakes were good PTA, reports consuming vegetables, protein and starch for meals. Pt especially enjoys eating oatmeal.

## 2017-08-11 NOTE — DIETITIAN INITIAL EVALUATION ADULT. - ENERGY NEEDS
Height: 67 inches (stated), Weight: 174 pounds  BMI: 27kg/m2 IBW: 135 pounds (+/-10%), %IBW:129%  Pertinent Info: Per chart, 85 y/o female with PMH of T2DM (A1c pending), CHF, Crohn's disease, admitted with low platelet count and hyponatremia after stopping lasix at home for several days, s/p IV lasix. +1 bilateral legs edema, no pressure ulcers noted at this time.

## 2017-08-12 DIAGNOSIS — R40.0 SOMNOLENCE: ICD-10-CM

## 2017-08-12 DIAGNOSIS — E87.1 HYPO-OSMOLALITY AND HYPONATREMIA: ICD-10-CM

## 2017-08-12 LAB
ALBUMIN SERPL ELPH-MCNC: 3.5 G/DL — SIGNIFICANT CHANGE UP (ref 3.3–5)
ALP SERPL-CCNC: 85 U/L — SIGNIFICANT CHANGE UP (ref 40–120)
ALT FLD-CCNC: 15 U/L — SIGNIFICANT CHANGE UP (ref 10–45)
ANION GAP SERPL CALC-SCNC: 9 MMOL/L — SIGNIFICANT CHANGE UP (ref 5–17)
AST SERPL-CCNC: 23 U/L — SIGNIFICANT CHANGE UP (ref 10–40)
BILIRUB SERPL-MCNC: 0.3 MG/DL — SIGNIFICANT CHANGE UP (ref 0.2–1.2)
BUN SERPL-MCNC: 19 MG/DL — SIGNIFICANT CHANGE UP (ref 7–23)
CALCIUM SERPL-MCNC: 9.5 MG/DL — SIGNIFICANT CHANGE UP (ref 8.4–10.5)
CHLORIDE SERPL-SCNC: 84 MMOL/L — LOW (ref 96–108)
CO2 SERPL-SCNC: 32 MMOL/L — HIGH (ref 22–31)
CREAT SERPL-MCNC: 1.22 MG/DL — SIGNIFICANT CHANGE UP (ref 0.5–1.3)
GLUCOSE SERPL-MCNC: 109 MG/DL — HIGH (ref 70–99)
POTASSIUM SERPL-MCNC: 5 MMOL/L — SIGNIFICANT CHANGE UP (ref 3.5–5.3)
POTASSIUM SERPL-SCNC: 5 MMOL/L — SIGNIFICANT CHANGE UP (ref 3.5–5.3)
PROT SERPL-MCNC: 6.5 G/DL — SIGNIFICANT CHANGE UP (ref 6–8.3)
SODIUM SERPL-SCNC: 125 MMOL/L — LOW (ref 135–145)

## 2017-08-12 PROCEDURE — 70450 CT HEAD/BRAIN W/O DYE: CPT | Mod: 26

## 2017-08-12 RX ORDER — SODIUM CHLORIDE 9 MG/ML
1000 INJECTION INTRAMUSCULAR; INTRAVENOUS; SUBCUTANEOUS
Qty: 0 | Refills: 0 | Status: COMPLETED | OUTPATIENT
Start: 2017-08-12 | End: 2017-08-12

## 2017-08-12 RX ORDER — SODIUM CHLORIDE 9 MG/ML
1000 INJECTION INTRAMUSCULAR; INTRAVENOUS; SUBCUTANEOUS
Qty: 0 | Refills: 0 | Status: DISCONTINUED | OUTPATIENT
Start: 2017-08-12 | End: 2017-08-12

## 2017-08-12 RX ORDER — FUROSEMIDE 40 MG
20 TABLET ORAL ONCE
Qty: 0 | Refills: 0 | Status: DISCONTINUED | OUTPATIENT
Start: 2017-08-12 | End: 2017-08-12

## 2017-08-12 RX ADMIN — Medication 500 MILLIGRAM(S): at 18:40

## 2017-08-12 RX ADMIN — Medication 120 MILLIGRAM(S): at 05:27

## 2017-08-12 RX ADMIN — Medication 325 MILLIGRAM(S): at 12:39

## 2017-08-12 RX ADMIN — DORZOLAMIDE HYDROCHLORIDE TIMOLOL MALEATE 1 DROP(S): 20; 5 SOLUTION/ DROPS OPHTHALMIC at 18:41

## 2017-08-12 RX ADMIN — Medication 1 MILLIGRAM(S): at 12:39

## 2017-08-12 RX ADMIN — BRIMONIDINE TARTRATE 1 DROP(S): 2 SOLUTION/ DROPS OPHTHALMIC at 05:26

## 2017-08-12 RX ADMIN — BRIMONIDINE TARTRATE 1 DROP(S): 2 SOLUTION/ DROPS OPHTHALMIC at 18:41

## 2017-08-12 RX ADMIN — SODIUM CHLORIDE 50 MILLILITER(S): 9 INJECTION INTRAMUSCULAR; INTRAVENOUS; SUBCUTANEOUS at 22:09

## 2017-08-12 RX ADMIN — GABAPENTIN 300 MILLIGRAM(S): 400 CAPSULE ORAL at 05:27

## 2017-08-12 RX ADMIN — SIMVASTATIN 20 MILLIGRAM(S): 20 TABLET, FILM COATED ORAL at 22:09

## 2017-08-12 RX ADMIN — Medication 50 MICROGRAM(S): at 05:27

## 2017-08-12 RX ADMIN — Medication 25 MILLIGRAM(S): at 22:09

## 2017-08-12 RX ADMIN — HEPARIN SODIUM 5000 UNIT(S): 5000 INJECTION INTRAVENOUS; SUBCUTANEOUS at 05:27

## 2017-08-12 RX ADMIN — SODIUM CHLORIDE 50 MILLILITER(S): 9 INJECTION INTRAMUSCULAR; INTRAVENOUS; SUBCUTANEOUS at 18:51

## 2017-08-12 RX ADMIN — Medication 25 MILLIGRAM(S): at 08:19

## 2017-08-12 RX ADMIN — DORZOLAMIDE HYDROCHLORIDE TIMOLOL MALEATE 1 DROP(S): 20; 5 SOLUTION/ DROPS OPHTHALMIC at 05:26

## 2017-08-12 RX ADMIN — HEPARIN SODIUM 5000 UNIT(S): 5000 INJECTION INTRAVENOUS; SUBCUTANEOUS at 18:40

## 2017-08-12 RX ADMIN — LATANOPROST 1 DROP(S): 0.05 SOLUTION/ DROPS OPHTHALMIC; TOPICAL at 22:09

## 2017-08-12 RX ADMIN — Medication 0.5 MILLIGRAM(S): at 05:26

## 2017-08-12 RX ADMIN — Medication 0.5 MILLIGRAM(S): at 20:12

## 2017-08-12 RX ADMIN — Medication 500 MILLIGRAM(S): at 05:27

## 2017-08-12 NOTE — PROGRESS NOTE ADULT - PROBLEM SELECTOR PLAN 2
Consider check ABG for CO2 retention  can check BMP for acute worsening of hyponatremia  In process of writing this A/P, code stroke called for new onset weakness as per bedside aide  evolving LATISHA, but unlikely to be uremic encephalopathy

## 2017-08-12 NOTE — PHYSICAL THERAPY INITIAL EVALUATION ADULT - PLANNED THERAPY INTERVENTIONS, PT EVAL
bed mobility training/strengthening/stair assessment/training/gait training/transfer training/balance training

## 2017-08-12 NOTE — PHYSICAL THERAPY INITIAL EVALUATION ADULT - IMPAIRMENTS CONTRIBUTING TO GAIT DEVIATIONS, PT EVAL
impaired postural control/impaired balance/decreased strength/narrow base of support/Pt with multiple episodes of ganga knee buckling in standing however, Le's did not give out on pt

## 2017-08-12 NOTE — PHYSICAL THERAPY INITIAL EVALUATION ADULT - TRANSFER SAFETY CONCERNS NOTED: SIT/STAND, REHAB EVAL
sit<-->stand x 2 reps. Pt anxious with standing, LE's sliding beneath pt/decreased weight-shifting ability/decreased safety awareness/losing balance/decreased sequencing ability

## 2017-08-12 NOTE — PHYSICAL THERAPY INITIAL EVALUATION ADULT - PERTINENT HX OF CURRENT PROBLEM, REHAB EVAL
Patient is a 84y old female who presents with a chief complaint of hyponatremia/low platelets. no chest pain,+ sob/underwood/pnd/ortopnea. Patient is a 84y old female who presents with a chief complaint of hyponatremia/low platelets. no chest pain,+ sob/underwood/pnd/orthopnea.  Pt   s/p iv lasix and na slowly improving favors her chronic chf as the cause.

## 2017-08-12 NOTE — PHYSICAL THERAPY INITIAL EVALUATION ADULT - LIVES WITH, PROFILE
Pt lives in a private house with 24 hour aide. Pt has 3 a stairs to enter with single railing, 0 stairs within house. Pt has 3 aides who alternate shifts to provide pt with 24 hour x 7 days coverage

## 2017-08-12 NOTE — PROGRESS NOTE ADULT - ASSESSMENT
83 y/o female with hyponatremia, found to have pulmonary arterial hypertension, now with worsening azotemia/evolving LATISHA, somnolence

## 2017-08-12 NOTE — PHYSICAL THERAPY INITIAL EVALUATION ADULT - DISCHARGE DISPOSITION, PT EVAL
Home PT for strength/balance training, progression of gait. Recommend resume assist with all functional activities as PTA

## 2017-08-13 DIAGNOSIS — G93.41 METABOLIC ENCEPHALOPATHY: ICD-10-CM

## 2017-08-13 LAB
ANION GAP SERPL CALC-SCNC: 10 MMOL/L — SIGNIFICANT CHANGE UP (ref 5–17)
BUN SERPL-MCNC: 24 MG/DL — HIGH (ref 7–23)
CALCIUM SERPL-MCNC: 8.9 MG/DL — SIGNIFICANT CHANGE UP (ref 8.4–10.5)
CHLORIDE SERPL-SCNC: 82 MMOL/L — LOW (ref 96–108)
CO2 SERPL-SCNC: 30 MMOL/L — SIGNIFICANT CHANGE UP (ref 22–31)
CREAT SERPL-MCNC: 1.1 MG/DL — SIGNIFICANT CHANGE UP (ref 0.5–1.3)
GLUCOSE SERPL-MCNC: 126 MG/DL — HIGH (ref 70–99)
POTASSIUM SERPL-MCNC: 4.9 MMOL/L — SIGNIFICANT CHANGE UP (ref 3.5–5.3)
POTASSIUM SERPL-SCNC: 4.9 MMOL/L — SIGNIFICANT CHANGE UP (ref 3.5–5.3)
SODIUM SERPL-SCNC: 122 MMOL/L — LOW (ref 135–145)

## 2017-08-13 RX ORDER — TOLVAPTAN 15 MG/1
15 TABLET ORAL ONCE
Qty: 0 | Refills: 0 | Status: COMPLETED | OUTPATIENT
Start: 2017-08-13 | End: 2017-08-13

## 2017-08-13 RX ADMIN — DORZOLAMIDE HYDROCHLORIDE TIMOLOL MALEATE 1 DROP(S): 20; 5 SOLUTION/ DROPS OPHTHALMIC at 18:30

## 2017-08-13 RX ADMIN — Medication 1 MILLIGRAM(S): at 12:22

## 2017-08-13 RX ADMIN — BRIMONIDINE TARTRATE 1 DROP(S): 2 SOLUTION/ DROPS OPHTHALMIC at 18:30

## 2017-08-13 RX ADMIN — Medication 500 MILLIGRAM(S): at 18:29

## 2017-08-13 RX ADMIN — LATANOPROST 1 DROP(S): 0.05 SOLUTION/ DROPS OPHTHALMIC; TOPICAL at 21:30

## 2017-08-13 RX ADMIN — TOLVAPTAN 15 MILLIGRAM(S): 15 TABLET ORAL at 12:22

## 2017-08-13 RX ADMIN — Medication 500 MILLIGRAM(S): at 05:43

## 2017-08-13 RX ADMIN — BRIMONIDINE TARTRATE 1 DROP(S): 2 SOLUTION/ DROPS OPHTHALMIC at 05:43

## 2017-08-13 RX ADMIN — Medication 0.5 MILLIGRAM(S): at 18:30

## 2017-08-13 RX ADMIN — Medication 50 MICROGRAM(S): at 05:43

## 2017-08-13 RX ADMIN — HEPARIN SODIUM 5000 UNIT(S): 5000 INJECTION INTRAVENOUS; SUBCUTANEOUS at 18:29

## 2017-08-13 RX ADMIN — Medication 0.5 MILLIGRAM(S): at 05:43

## 2017-08-13 RX ADMIN — HEPARIN SODIUM 5000 UNIT(S): 5000 INJECTION INTRAVENOUS; SUBCUTANEOUS at 05:43

## 2017-08-13 RX ADMIN — Medication 25 MILLIGRAM(S): at 18:29

## 2017-08-13 RX ADMIN — Medication 325 MILLIGRAM(S): at 12:22

## 2017-08-13 RX ADMIN — SIMVASTATIN 20 MILLIGRAM(S): 20 TABLET, FILM COATED ORAL at 21:30

## 2017-08-13 RX ADMIN — GABAPENTIN 300 MILLIGRAM(S): 400 CAPSULE ORAL at 05:43

## 2017-08-13 RX ADMIN — DORZOLAMIDE HYDROCHLORIDE TIMOLOL MALEATE 1 DROP(S): 20; 5 SOLUTION/ DROPS OPHTHALMIC at 05:44

## 2017-08-13 RX ADMIN — Medication 120 MILLIGRAM(S): at 07:54

## 2017-08-13 NOTE — CONSULT NOTE ADULT - PROBLEM SELECTOR RECOMMENDATION 9
correct electrolyte abnormalities   CT head   secondary stroke prevention ASA, statin, DM2T control, HTN control.   if patient's continue to persist consider further imaging with MRI.

## 2017-08-13 NOTE — CONSULT NOTE ADULT - ASSESSMENT
This is a 84 year old woman on DNR admitted for hyponatremia and thrombocytopenia. Patient consulted by neurology for r/o stroke at 249pm when code was initiated. NIHSS: 4 (mainly due to altered mental status), MRS: 4. Physical exam remarkable for alert, oriented to person only, follows simple commands only. CT head pending. Impression possible metabolic encephalopathy 2/2 hyponatremia. Unlikely occurrence of stroke due to poor localization of symptoms.
83 yo F with hyponatremia now with s/p iv lasix and na slowly imoproving favors her chronic chf as the cause   urine lytes will not be accurate at present given pt receiving lasix again. if sodium does not improve then to check urine sodium   check serum sodium in am as well as uric acid
PT  WITH  WEAKNESS,  HYPONATREMIA, LASIX  ON  HOLD,  H/O  DIASTOLIC  CHF.  ECHO    H/O  SEVERE  PHTN   SEEN BY CARD    DVT PPX. PT  EVAL

## 2017-08-13 NOTE — PROGRESS NOTE ADULT - PROBLEM SELECTOR PLAN 1
No response to lasix or salt tabs.  Given improved renal function s/p gentle hydration yesterday, will order tolvaptan 15mg PO x 1 today  Pharmacy contacted for non-formulary order  Repeat BMP tomorrow AM
No response to lasix or salt tabs.  In light of worsening azotemia, would defer use of tolvaptan at this time

## 2017-08-13 NOTE — CONSULT NOTE ADULT - SUBJECTIVE AND OBJECTIVE BOX
Neurology Consult    Name: GARRET GRIFFITH    HPI: This is a 84 year old woman on DNR admitted for hyponatremia and thrombocytopenia. Patient consulted by neurology for r/o stroke at 249pm when code was initiated. NIHSS: 4 (mainly due to altered mental status), MRS: 4.     PAST MEDICAL & SURGICAL HISTORY:  CHF (congestive heart failure)  Pulmonary HTN  Cholecystitis, acute  Total knee replacement status  Hypothyroidism  Diabetes mellitus  Arthritis  Crohn's disease  Diabetes Mellitus  HTN (Hypertension)  History of Spinal Stenosis  Crohn's Disease  S/P cataract surgery: bilateral - R 2007 / L 2012  S/P cholecystectomy  H/O shoulder surgery: R rotator cuff repair  S/P Knee Replacement: 1985  Knee Replacement: right total knee replacement    MEDICATIONS  (STANDING):  sulfaSALAzine 500 milliGRAM(s) Oral two times a day  latanoprost 0.005% Ophthalmic Solution 1 Drop(s) Both EYES at bedtime  buDESOnide   0.5 milliGRAM(s) Respule 0.5 milliGRAM(s) Inhalation two times a day  diltiazem    milliGRAM(s) Oral daily  gabapentin 300 milliGRAM(s) Oral two times a day  simvastatin 20 milliGRAM(s) Oral at bedtime  metoprolol 25 milliGRAM(s) Oral two times a day  ferrous    sulfate 325 milliGRAM(s) Oral daily  brimonidine 0.2% Ophthalmic Solution 1 Drop(s) Both EYES two times a day  dorzolamide 2%/timolol 0.5% Ophthalmic Solution 1 Drop(s) Both EYES two times a day  levothyroxine 50 MICROGram(s) Oral daily  folic acid 1 milliGRAM(s) Oral daily  heparin  Injectable 5000 Unit(s) SubCutaneous every 12 hours  oxymorphone ER 10 milliGRAM(s) Oral every 12 hours    MEDICATIONS  (PRN):  ALPRAZolam 0.125 milliGRAM(s) Oral every 8 hours PRN anxiety  ALBUTerol/ipratropium for Nebulization. 3 milliLiter(s) Nebulizer once PRN Bronchospasm    Allergies: No Known Allergies    Objective:   Vital Signs Last 24 Hrs  T(C): 37 (13 Aug 2017 03:49), Max: 37 (13 Aug 2017 03:49)  T(F): 98.6 (13 Aug 2017 03:49), Max: 98.6 (13 Aug 2017 03:49)  HR: 55 (13 Aug 2017 03:49) (55 - 89)  BP: 94/61 (13 Aug 2017 03:49) (94/61 - 133/79)  BP(mean): --  RR: 18 (13 Aug 2017 03:49) (17 - 19)  SpO2: 96% (13 Aug 2017 03:49) (87% - 96%)    General Exam:   General appearance: well-developed, No acute distress                   Neurological Exam:  Mental Status: alert, oriented to person only, fluent speech, follows simple commands    Cranial Nerves: EOMI, PERRL, V1-V3 intact, facial symmetry intact, no dysarthria, tongue midline    Motor: 5/5 throughout. No drift x4    Sensation: Intact to LT throughout    Coordination: FTN intact b/l    Reflexes: Babinski absent b/l (toes downgoing)     Gait: not assessed.       Labs:    08-12    125<L>  |  84<L>  |  19  ----------------------------<  109<H>  5.0   |  32<H>  |  1.22    Ca    9.5      12 Aug 2017 08:48    TPro  6.5  /  Alb  3.5  /  TBili  0.3  /  DBili  x   /  AST  23  /  ALT  15  /  AlkPhos  85  08-12    LIVER FUNCTIONS - ( 12 Aug 2017 08:48 )  Alb: 3.5 g/dL / Pro: 6.5 g/dL / ALK PHOS: 85 U/L / ALT: 15 U/L / AST: 23 U/L / GGT: x           CBC Full  -  ( 11 Aug 2017 07:12 )  WBC Count : 6.41 K/uL  Hemoglobin : 11.8 g/dL  Hematocrit : 34.4 %  Platelet Count - Automated : 106 K/uL  Mean Cell Volume : 92.0 fl  Mean Cell Hemoglobin : 31.6 pg  Mean Cell Hemoglobin Concentration : 34.3 gm/dL  Auto Neutrophil # : x  Auto Lymphocyte # : x  Auto Monocyte # : x  Auto Eosinophil # : x  Auto Basophil # : x  Auto Neutrophil % : x  Auto Lymphocyte % : x  Auto Monocyte % : x  Auto Eosinophil % : x  Auto Basophil % : x      Radiology
HPI::Patient is a 84y old  Female who presents with a chief complaint of hyponatremia/low platelets. no chest pain,+ sob/underwood/pnd/ortopnea  pt stopped her lasix few days ago. Seen by pmd was sent to er due to platelets of 50.no bleeding    PAST MEDICAL & SURGICAL HISTORY:  CHF (congestive heart failure)  Pulmonary HTN  Cholecystitis, acute  Total knee replacement status  Hypothyroidism  DREVIEW OF SYSTEMS:    CONSTITUTIONAL:   has   weakness,  no fevers or chills  EYES/ENT: No visual changes;  No vertigo or throat pain   NECK: No pain or stiffness  RESPIRATORY: No cough, wheezing, hemoptysis; No shortness of breath  CARDIOVASCULAR: No chest pain or palpitations  GASTROINTESTINAL: No abdominal or epigastric pain. No nausea, vomiting, or hematemesis; No diarrhea or constipation. No melena or hematochezia.  GENITOURINARY: No dysuria, frequency or hematuria  NEUROLOGICAL: No numbness or weakness  SPHYSICAL EXAMINATION:  Vital Signs Last 24 Hrs  T(C): 37 (10 Aug 2017 04:08), Max: 37 (09 Aug 2017 22:03)  T(F): 98.6 (10 Aug 2017 04:08), Max: 98.6 (09 Aug 2017 22:03)  HR: 69 (10 Aug 2017 04:08) (69 - 86)  BP: 105/68 (10 Aug 2017 04:08) (105/68 - 150/70)  BP(mean): --  RR: 17 (10 Aug 2017 04:08) (17 - 17)  SpO2: 96% (10 Aug 2017 04:08) (96% - 98%)  CAPILLARY BLOOD GLUCOSE          08-09 @ 07:01  -  08-10 @ 07:00  --------------------------------------------------------  IN: 200 mL / OUT: 0 mL / NET: 200 mL        GENERAL: NAD, well-groomed, well-developed  HEAD:  atraumatic, normocephalic  EYES: sclera anicteric  ENMT: mucous membranes moist  NECK: supple, No JVD  CHEST/LUNG: clear to auscultation bilaterally; no rales, rhonchi, or wheezing b/l  HEART: normal S1, S2  ABDOMEN: BS+, soft, ND, NT   EXTREMITIES:  pulses palpable; no clubbing, cyanosis, or edema b/l LEs  NEURO: awake, alert, interactive; moves all extremities  SKIN: no rashes or lesions    LABS:                        12.9   7.2   )-----------( 117      ( 10 Aug 2017 10:01 )             37.1     08-10    127<L>  |  86<L>  |  9   ----------------------------<  115<H>  4.6   |  33<H>  |  0.73    Ca    8.9      10 Aug 2017 06:34    TPro  6.8  /  Alb  3.6  /  TBili  0.5  /  DBili  x   /  AST  21  /  ALT  19  /  AlkPhos  82  08-09    PT/INR - ( 09 Aug 2017 16:28 )   PT: 12.3 sec;   INR: 1.13 ratio         PTT - ( 09 Aug 2017 16:28 )  PTT:39.3 sec        RADIOLOGY & ADDITIONAL TESTS:
[facility]  [phone]  NEPHROLOGY      INITIAL CONSULT NOTE  --------------------------------------------------------------------------------  HPI:    84y old  Female who presents with a chief complaint of hyponatremia/low platelets. no chest pain,+ sob/underwood/pnd/ortopnea  pt stopped her lasix few days ago. noticed with sodium 124 started on lasix renal consult called    PAST HISTORY  --------------------------------------------------------------------------------  PAST MEDICAL & SURGICAL HISTORY:  CHF (congestive heart failure)  Pulmonary HTN  Cholecystitis, acute  Total knee replacement status  Hypothyroidism  Diabetes mellitus  Arthritis  Crohn's disease  Diabetes Mellitus  HTN (Hypertension)  History of Spinal Stenosis  Crohn's Disease  S/P cataract surgery: bilateral - R 2007 / L 2012  S/P cholecystectomy  H/O shoulder surgery: R rotator cuff repair  S/P Knee Replacement: 1985  Knee Replacement: right total knee replacement 2005    FAMILY HISTORY:    PAST SOCIAL HISTORY:    ALLERGIES & MEDICATIONS  --------------------------------------------------------------------------------  Allergies    No Known Allergies    Intolerances      Standing Inpatient Medications  sulfaSALAzine 500 milliGRAM(s) Oral two times a day  latanoprost 0.005% Ophthalmic Solution 1 Drop(s) Both EYES at bedtime  buDESOnide   0.5 milliGRAM(s) Respule 0.5 milliGRAM(s) Inhalation two times a day  diltiazem    milliGRAM(s) Oral daily  gabapentin 300 milliGRAM(s) Oral two times a day  citalopram 10 milliGRAM(s) Oral daily  simvastatin 20 milliGRAM(s) Oral at bedtime  metoprolol 25 milliGRAM(s) Oral two times a day  ferrous    sulfate 325 milliGRAM(s) Oral daily  brimonidine 0.2% Ophthalmic Solution 1 Drop(s) Both EYES two times a day  dorzolamide 2%/timolol 0.5% Ophthalmic Solution 1 Drop(s) Both EYES two times a day  levothyroxine 50 MICROGram(s) Oral daily  folic acid 1 milliGRAM(s) Oral daily  heparin  Injectable 5000 Unit(s) SubCutaneous every 12 hours  oxymorphone ER 10 milliGRAM(s) Oral every 12 hours    PRN Inpatient Medications  ALPRAZolam 0.125 milliGRAM(s) Oral every 8 hours PRN  ALBUTerol/ipratropium for Nebulization. 3 milliLiter(s) Nebulizer once PRN      REVIEW OF SYSTEMS  --------------------------------------------------------------------------------  Gen: not answering ROS at present per aide decrease po intake overall   no n/v or diarrhea noted. no cp or palp c/o voiced         VITALS/PHYSICAL EXAM  --------------------------------------------------------------------------------  T(C): 36.4 (08-10-17 @ 12:56), Max: 37 (08-09-17 @ 22:03)  HR: 67 (08-10-17 @ 12:56) (67 - 86)  BP: 151/74 (08-10-17 @ 12:56) (105/68 - 151/74)  RR: 18 (08-10-17 @ 12:56) (17 - 18)  SpO2: 97% (08-10-17 @ 12:56) (96% - 98%)  Wt(kg): --    Weight (kg): 79.2 (08-10-17 @ 10:49)      08-09-17 @ 07:01  -  08-10-17 @ 07:00  --------------------------------------------------------  IN: 200 mL / OUT: 0 mL / NET: 200 mL    08-10-17 @ 07:01  -  08-10-17 @ 18:57  --------------------------------------------------------  IN: 580 mL / OUT: 600 mL / NET: -20 mL      Physical Exam:  	Gen: falls asleep easily  	no jvd  	Pulm: mild decrease bs no rales or ronchi  	CV: RRR, S1S2; no rub  	Abd: +BS, soft, nontender/nondistended  	: No suprapubic tenderness  	LE:no edema  	skin no decrease skin turgor  LABS/STUDIES  --------------------------------------------------------------------------------              12.9   7.2   >-----------<  117      [08-10-17 @ 10:01]              37.1     126  |  85  |  9   ----------------------------<  138      [08-10-17 @ 10:01]  4.1   |  30  |  0.77        Ca     8.7     [08-10-17 @ 10:01]    TPro  6.3  /  Alb  3.1  /  TBili  0.4  /  DBili  x   /  AST  24  /  ALT  18  /  AlkPhos  75  [08-10-17 @ 10:01]    PT/INR: PT 12.3 , INR 1.13       [08-09-17 @ 16:28]  PTT: 39.3       [08-09-17 @ 16:28]    Uric acid 5.4      [08-10-17 @ 10:01]  Troponin <0.01      [08-09-17 @ 16:30]    Creatinine Trend:  SCr 0.77 [08-10 @ 10:01]  SCr 0.73 [08-10 @ 06:34]  SCr 0.70 [08-09 @ 16:30]    Urinalysis - [07-10-15 @ 18:47]      Color Yellow / Appearance Clear / SG 1.006 / pH 6.5      Gluc Negative / Ketone Negative  / Bili Negative / Urobili Normal       Blood Negative / Protein Negative / Leuk Est Negative / Nitrite Negative      RBC  / WBC  / Hyaline  / Gran  / Sq Epi  / Non Sq Epi  / Bacteria       HbA1c 4.8      [07-14-15 @ 08:17]  TSH 1.40      [08-10-17 @ 12:13]
Patient is a 84y old  Female who presents with a chief complaint of low platelets/htn/cad/chf (09 Aug 2017 19:56)      HPI:  HPI::Patient is a 84y old  Female who presents with a chief complaint of hyponatremia/low platelets. no chest pain,+ sob/underwood/pnd/ortopnea  pt stopped her lasix few days ago. Seen by pmd was sent to er due to platelets of 50.no bleeding.      Chief complain platelets/sob    PAST MEDICAL & SURGICAL HISTORY:  CHF (congestive heart failure)  Pulmonary HTN  Cholecystitis, acute  Total knee replacement status  Hypothyroidism  Diabetes mellitus  Arthritis  Crohn's disease  Diabetes Mellitus  HTN (Hypertension)  History of Spinal Stenosis  Crohn's Disease  S/P cataract surgery: bilateral - R  / L   S/P cholecystectomy  H/O shoulder surgery: R rotator cuff repair  S/P Knee Replacement:   Knee Replacement: right total knee replacement       MEDICATIONS  (STANDING):    MEDICATIONS  (PRN):      FAMILY HISTORY:      SOCIAL HISTORY:    [ ] Non-smoker  [ ] Smoker  [ ] Alcohol    Allergies    No Known Allergies    Intolerances    	    REVIEW OF SYSTEMS:  CONSTITUTIONAL: No fever,+ weight loss,+ fatigue  EYES: No eye pain, visual disturbances, or discharge  ENT:  No difficulty hearing, tinnitus, vertigo; No sinus or throat pain  NECK: No pain or stiffness  RESPIRATORY: No cough, wheezing, chills or hemoptysis; + Shortness of Breath  CARDIOVASCULAR: No chest pain, palpitations, passing out, dizziness, or leg swelling  GASTROINTESTINAL: No abdominal or epigastric pain. No nausea, vomiting, or hematemesis; No diarrhea or constipation. No melena or hematochezia.  GENITOURINARY: No dysuria, frequency, hematuria, or incontinence  NEUROLOGICAL: No headaches, memory loss, loss of strength, numbness, or tremors  SKIN: No itching, burning, rashes, or lesions   LYMPH Nodes: No enlarged glands  ENDOCRINE: No heat or cold intolerance; No hair loss  MUSCULOSKELETAL: No joint pain or swelling; No muscle, back, or extremity pain  PSYCHIATRIC: No depression, anxiety, mood swings, or difficulty sleeping  HEME/LYMPH: No easy bruising, or bleeding gums  ALLERGY AND IMMUNOLOGIC: No hives or eczema	    [ ] All others negative	  [ ] Unable to obtain    PHYSICAL EXAM:  T(C): --  HR: --  BP: --  RR: --  SpO2: --  Wt(kg): --  I&O's Summary      Appearance: Normal	  HEENT:   Normal oral mucosa, PERRL, EOMI	  Lymphatic: No lymphadenopathy  Cardiovascular: Normal S1 S2, No JVD, Systolic murmurs, + edema  Respiratory: decrease bs b/l  Psychiatry: A & O x 3, Mood & affect appropriate  Gastrointestinal:  Soft, Non-tender, + BS	  Skin: No rashes, No ecchymoses, No cyanosis	  Neurologic: Non-focal  Extremities: Normal range of motion, No clubbing + edema  Vascular: Peripheral pulses palpable 2+ bilaterally    TELEMETRY: 	    ECG:  	  RADIOLOGY:  OTHER: 	  	  LABS:	 	    CARDIAC MARKERS:  CARDIAC MARKERS ( 09 Aug 2017 16:30 )  x     / <0.01 ng/mL / x     / x     / x           Xray Chest 1 View AP -PORTABLE-Routine (07.10.15 @ 16:56) >  Cardiomediastinal silhouette is unchanged.  No focal consolidation.  There are no pleural effusions.  There is no evidence of pneumothorax.    IMPRESSION: No focal consolidation.    5.7   )-----------( CLUMPED    ( 09 Aug 2017 16:26 )             38.6         124<L>  |  86<L>  |  8   ----------------------------<  119<H>  4.7   |  27  |  0.70    Ca    8.9      09 Aug 2017 16:30    TPro  6.8  /  Alb  3.6  /  TBili  0.5  /  DBili  x   /  AST  21  /  ALT  19  /  AlkPhos  82      proBNP: Serum Pro-Brain Natriuretic Peptide: 1987 pg/mL ( @ 16:30)    Lipid Profile:   HgA1c:   TSH:      < from: 12 Lead ECG (06.12.15 @ 11:39) >  NORMAL SINUS RHYTHM  ST & T WAVE ABNORMALITY, CONSIDER INFERIOR ISCHEMIA  ST & T WAVE ABNORMALITY, CONSIDER ANTEROLATERAL ISCHEMIA  ABNORMAL ECG    < from: Transthoracic Echocardiogram (04.22.15 @ 08:03) >  1. Increased relative wall thickness with normal left  ventricular mass index, consistent with concentric left  ventricular remodeling.  2. Normal left ventricular systolic function. No segmental  wall motion abnormalities. Flattening of the  interventricular septum in both systole and diastole is  consistent with right ventricular pressure overload.  3. Decreased right ventricular systolic function.  Right  ventricular enlargement (09 Aug 2017 19:56)           *****PAST MEDICAL / Surgical  HISTORY:  PAST MEDICAL & SURGICAL HISTORY:  CHF (congestive heart failure)  Pulmonary HTN  Cholecystitis, acute  Total knee replacement status  Hypothyroidism  Diabetes mellitus  Arthritis  Crohn's disease  Diabetes Mellitus  HTN (Hypertension)  History of Spinal Stenosis  Crohn's Disease  S/P cataract surgery: bilateral - R  / L   S/P cholecystectomy  H/O shoulder surgery: R rotator cuff repair  S/P Knee Replacement:   Knee Replacement: right total knee replacement            *****FAMILY HISTORY:  FAMILY HISTORY:           *****SOCIAL HISTORY:  Alcohol: None  Smoking: None         *****ALLERGIES:   Allergies    No Known Allergies    Intolerances             *****MEDICATIONS:  MEDICATIONS  (STANDING):  sulfaSALAzine 500 milliGRAM(s) Oral two times a day  latanoprost 0.005% Ophthalmic Solution 1 Drop(s) Both EYES at bedtime  buDESOnide   0.5 milliGRAM(s) Respule 0.5 milliGRAM(s) Inhalation two times a day  diltiazem    milliGRAM(s) Oral daily  gabapentin 300 milliGRAM(s) Oral two times a day  citalopram 10 milliGRAM(s) Oral daily  simvastatin 20 milliGRAM(s) Oral at bedtime  metoprolol 25 milliGRAM(s) Oral two times a day  ferrous    sulfate 325 milliGRAM(s) Oral daily  brimonidine 0.2% Ophthalmic Solution 1 Drop(s) Both EYES two times a day  dorzolamide 2%/timolol 0.5% Ophthalmic Solution 1 Drop(s) Both EYES two times a day  levothyroxine 50 MICROGram(s) Oral daily  folic acid 1 milliGRAM(s) Oral daily  heparin  Injectable 5000 Unit(s) SubCutaneous every 12 hours    MEDICATIONS  (PRN):  ALPRAZolam 0.125 milliGRAM(s) Oral every 8 hours PRN anxiety  ALBUTerol/ipratropium for Nebulization. 3 milliLiter(s) Nebulizer once PRN Bronchospasm           *****REVIEW OF SYSTEM:  GEN: no fever, no chills, no pain  RESP: no SOB, no cough, no sputum  CVS: no chest pain, no palpitations, no edema  GI: no abdominal pain, no nausea, no vomiting, no constipation, no diarrhea  : no dysurea, no frequency, no hematurea  Neuro: no headache, no dizziness  PSYCH: no anxiety, no depression  Derm : no itching, no rash         *****VITAL SIGNS:  T(C): 37 (08-10-17 @ 04:08), Max: 37 (17 @ 22:03)  HR: 69 (08-10-17 @ 04:08) (69 - 86)  BP: 105/68 (08-10-17 @ 04:08) (105/68 - 150/70)  RR: 17 (08-10-17 @ 04:08) (17 - 17)  SpO2: 96% (08-10-17 @ 04:08) (96% - 98%)  Wt(kg): --     @ 07:01  -  08-10 @ 07:00  --------------------------------------------------------  IN: 200 mL / OUT: 0 mL / NET: 200 mL    08-10 @ 07:01  -  08-10 @ 11:45  --------------------------------------------------------  IN: 280 mL / OUT: 400 mL / NET: -120 mL             *****PHYSICAL EXAM:  GEN: A&O X 3 , NAD , comfortable  HEENT: NCAT, PERRL, MMM, hearing intact  Neck: supple , no JVD  CVS: S1S2 , regular , No M/R/G appreciated  PULM: CTA B/L,  no W/R/R appreciated  ABD.: soft. non tender, non distended,  bowel sounds present  Extrem: intact pulses , no edema   Derm: No rash , no ecchymoses  PSYCH : normal mood,  no delusion not anxious         *****LAB AND IMAGIN.9   7.2   )-----------( 117      ( 10 Aug 2017 10:01 )             37.1               08-10    126<L>  |  85<L>  |  9   ----------------------------<  138<H>  4.1   |  30  |  0.77    Ca    8.7      10 Aug 2017 10:01    TPro  6.3  /  Alb  3.1<L>  /  TBili  0.4  /  DBili  x   /  AST  24  /  ALT  18  /  AlkPhos  75  08-10    PT/INR - ( 09 Aug 2017 16:28 )   PT: 12.3 sec;   INR: 1.13 ratio         PTT - ( 09 Aug 2017 16:28 )  PTT:39.3 sec            CARDIAC MARKERS ( 09 Aug 2017 16:30 )  x     / <0.01 ng/mL / x     / x     / x                      [All pertinent recent Imaging reports reviewed]         *****A S S E S S M E N T   A N D   P L A N :    84F with CHF, non compliant with lasix, low NA, low platelets  hold asa, heparin  heme consult  R sided chf due to pulmonary htn   continue cardiac meds,.  repeat echo  iv lasix for O>I  hyponatremia due fluid overload since pt did take her lasix    ___________________________  Will follow with you.  Thank you,  YANY Cabral D.O.

## 2017-08-13 NOTE — CONSULT NOTE ADULT - ATTENDING COMMENTS
This is a 84 year old woman on DNR admitted for hyponatremia and thrombocytopenia. Patient consulted by neurology for r/o stroke at 249pm when code was initiated. NIHSS: 4 (mainly due to altered mental status), MRS: 4. Physical exam remarkable for alert, oriented to person only, follows simple commands only. CT head pending. Impression possible metabolic encephalopathy 2/2 hyponatremia. Unlikely occurrence of stroke due to poor localization of symptoms. however will follow up after head ct is completed

## 2017-08-13 NOTE — PROGRESS NOTE ADULT - ASSESSMENT
PT  BED BOUND, EVNAS   HYPONATREMIA,  worsening   S.P  CODE  STROKE  YESTERDAY,  SEEN BY NEURO  FOR  ALTERED  MENTAL  STATUS, WHICH COULD BE FROM HYPONATREMIA, RENAL TO   F/P

## 2017-08-14 LAB
ANION GAP SERPL CALC-SCNC: 13 MMOL/L — SIGNIFICANT CHANGE UP (ref 5–17)
ANION GAP SERPL CALC-SCNC: 9 MMOL/L — SIGNIFICANT CHANGE UP (ref 5–17)
BUN SERPL-MCNC: 24 MG/DL — HIGH (ref 7–23)
BUN SERPL-MCNC: 27 MG/DL — HIGH (ref 7–23)
CALCIUM SERPL-MCNC: 8.8 MG/DL — SIGNIFICANT CHANGE UP (ref 8.4–10.5)
CALCIUM SERPL-MCNC: 9.2 MG/DL — SIGNIFICANT CHANGE UP (ref 8.4–10.5)
CHLORIDE SERPL-SCNC: 82 MMOL/L — LOW (ref 96–108)
CHLORIDE SERPL-SCNC: 84 MMOL/L — LOW (ref 96–108)
CO2 SERPL-SCNC: 28 MMOL/L — SIGNIFICANT CHANGE UP (ref 22–31)
CO2 SERPL-SCNC: 31 MMOL/L — SIGNIFICANT CHANGE UP (ref 22–31)
CREAT SERPL-MCNC: 0.84 MG/DL — SIGNIFICANT CHANGE UP (ref 0.5–1.3)
CREAT SERPL-MCNC: 0.92 MG/DL — SIGNIFICANT CHANGE UP (ref 0.5–1.3)
GLUCOSE SERPL-MCNC: 125 MG/DL — HIGH (ref 70–99)
GLUCOSE SERPL-MCNC: 126 MG/DL — HIGH (ref 70–99)
HCT VFR BLD CALC: 36.7 % — SIGNIFICANT CHANGE UP (ref 34.5–45)
HGB BLD-MCNC: 12.3 G/DL — SIGNIFICANT CHANGE UP (ref 11.5–15.5)
MCHC RBC-ENTMCNC: 32.2 PG — SIGNIFICANT CHANGE UP (ref 27–34)
MCHC RBC-ENTMCNC: 33.5 GM/DL — SIGNIFICANT CHANGE UP (ref 32–36)
MCV RBC AUTO: 95.9 FL — SIGNIFICANT CHANGE UP (ref 80–100)
PLATELET # BLD AUTO: 124 K/UL — LOW (ref 150–400)
POTASSIUM SERPL-MCNC: 5.3 MMOL/L — SIGNIFICANT CHANGE UP (ref 3.5–5.3)
POTASSIUM SERPL-MCNC: 5.4 MMOL/L — HIGH (ref 3.5–5.3)
POTASSIUM SERPL-SCNC: 5.3 MMOL/L — SIGNIFICANT CHANGE UP (ref 3.5–5.3)
POTASSIUM SERPL-SCNC: 5.4 MMOL/L — HIGH (ref 3.5–5.3)
RBC # BLD: 3.83 M/UL — SIGNIFICANT CHANGE UP (ref 3.8–5.2)
RBC # FLD: 11.6 % — SIGNIFICANT CHANGE UP (ref 10.3–14.5)
SODIUM SERPL-SCNC: 123 MMOL/L — LOW (ref 135–145)
SODIUM SERPL-SCNC: 124 MMOL/L — LOW (ref 135–145)
WBC # BLD: 13.8 K/UL — HIGH (ref 3.8–10.5)
WBC # FLD AUTO: 13.8 K/UL — HIGH (ref 3.8–10.5)

## 2017-08-14 RX ORDER — FUROSEMIDE 40 MG
20 TABLET ORAL DAILY
Qty: 0 | Refills: 0 | Status: DISCONTINUED | OUTPATIENT
Start: 2017-08-14 | End: 2017-08-17

## 2017-08-14 RX ADMIN — Medication 500 MILLIGRAM(S): at 06:21

## 2017-08-14 RX ADMIN — Medication 25 MILLIGRAM(S): at 17:31

## 2017-08-14 RX ADMIN — HEPARIN SODIUM 5000 UNIT(S): 5000 INJECTION INTRAVENOUS; SUBCUTANEOUS at 17:32

## 2017-08-14 RX ADMIN — Medication 500 MILLIGRAM(S): at 17:31

## 2017-08-14 RX ADMIN — Medication 325 MILLIGRAM(S): at 12:57

## 2017-08-14 RX ADMIN — BRIMONIDINE TARTRATE 1 DROP(S): 2 SOLUTION/ DROPS OPHTHALMIC at 17:32

## 2017-08-14 RX ADMIN — Medication 0.5 MILLIGRAM(S): at 17:31

## 2017-08-14 RX ADMIN — Medication 20 MILLIGRAM(S): at 17:36

## 2017-08-14 RX ADMIN — DORZOLAMIDE HYDROCHLORIDE TIMOLOL MALEATE 1 DROP(S): 20; 5 SOLUTION/ DROPS OPHTHALMIC at 17:32

## 2017-08-14 RX ADMIN — DORZOLAMIDE HYDROCHLORIDE TIMOLOL MALEATE 1 DROP(S): 20; 5 SOLUTION/ DROPS OPHTHALMIC at 05:22

## 2017-08-14 RX ADMIN — LATANOPROST 1 DROP(S): 0.05 SOLUTION/ DROPS OPHTHALMIC; TOPICAL at 22:11

## 2017-08-14 RX ADMIN — Medication 25 MILLIGRAM(S): at 05:24

## 2017-08-14 RX ADMIN — HEPARIN SODIUM 5000 UNIT(S): 5000 INJECTION INTRAVENOUS; SUBCUTANEOUS at 05:22

## 2017-08-14 RX ADMIN — SIMVASTATIN 20 MILLIGRAM(S): 20 TABLET, FILM COATED ORAL at 22:11

## 2017-08-14 RX ADMIN — Medication 1 MILLIGRAM(S): at 12:57

## 2017-08-14 RX ADMIN — Medication 50 MICROGRAM(S): at 05:22

## 2017-08-14 RX ADMIN — BRIMONIDINE TARTRATE 1 DROP(S): 2 SOLUTION/ DROPS OPHTHALMIC at 05:22

## 2017-08-14 RX ADMIN — Medication 0.5 MILLIGRAM(S): at 05:22

## 2017-08-14 NOTE — PROGRESS NOTE ADULT - ASSESSMENT
85 yo F with hyponatremia with hx of chf as well along with borderline hyperkalemia with right sided chf    1- hyponatremia  2- hyperkalemia  3- AMS    dc gabapentin given increase lethargy  pt without response to samsca and k is worse along with r chf  therefore, suspect r chf still the cause however, to have cortisol level checked as well.   will hold further samsca given intermittent low bp's   can resume lasix

## 2017-08-14 NOTE — PROGRESS NOTE ADULT - ASSESSMENT
PT  WITH  WEAKNESS,  HYPONATREMIA, LASIX  ON  HOLD,  H/O  DIASTOLIC  CHF.  ECHO    H/O  SEVERE  PHTN   SEEN BY CARD    DVT PPX. PT  EVAL   RENAL TO F/P,   FOLLOW  BMP IN  AM

## 2017-08-15 LAB
ANION GAP SERPL CALC-SCNC: 12 MMOL/L — SIGNIFICANT CHANGE UP (ref 5–17)
APPEARANCE UR: ABNORMAL
BACTERIA # UR AUTO: ABNORMAL
BILIRUB UR-MCNC: NEGATIVE — SIGNIFICANT CHANGE UP
BUN SERPL-MCNC: 23 MG/DL — SIGNIFICANT CHANGE UP (ref 7–23)
CALCIUM SERPL-MCNC: 8.4 MG/DL — SIGNIFICANT CHANGE UP (ref 8.4–10.5)
CHLORIDE SERPL-SCNC: 85 MMOL/L — LOW (ref 96–108)
CO2 SERPL-SCNC: 31 MMOL/L — SIGNIFICANT CHANGE UP (ref 22–31)
COLOR SPEC: YELLOW — SIGNIFICANT CHANGE UP
CORTIS AM PEAK SERPL-MCNC: 22.7 UG/DL — HIGH (ref 6–18.4)
CREAT SERPL-MCNC: 0.66 MG/DL — SIGNIFICANT CHANGE UP (ref 0.5–1.3)
DIFF PNL FLD: ABNORMAL
EPI CELLS # UR: 2 /HPF — SIGNIFICANT CHANGE UP (ref 0–5)
GLUCOSE SERPL-MCNC: 116 MG/DL — HIGH (ref 70–99)
GLUCOSE UR QL: NEGATIVE MG/DL — SIGNIFICANT CHANGE UP
HCT VFR BLD CALC: 33.6 % — LOW (ref 34.5–45)
HGB BLD-MCNC: 11.2 G/DL — LOW (ref 11.5–15.5)
HYALINE CASTS # UR AUTO: 5 /LPF — SIGNIFICANT CHANGE UP (ref 0–7)
KETONES UR-MCNC: NEGATIVE — SIGNIFICANT CHANGE UP
LEUKOCYTE ESTERASE UR-ACNC: ABNORMAL
MCHC RBC-ENTMCNC: 30.5 PG — SIGNIFICANT CHANGE UP (ref 27–34)
MCHC RBC-ENTMCNC: 33.3 GM/DL — SIGNIFICANT CHANGE UP (ref 32–36)
MCV RBC AUTO: 91.6 FL — SIGNIFICANT CHANGE UP (ref 80–100)
NITRITE UR-MCNC: POSITIVE
PH UR: 6.5 — SIGNIFICANT CHANGE UP (ref 5–8)
PLATELET # BLD AUTO: 150 K/UL — SIGNIFICANT CHANGE UP (ref 150–400)
POTASSIUM SERPL-MCNC: 4.9 MMOL/L — SIGNIFICANT CHANGE UP (ref 3.5–5.3)
POTASSIUM SERPL-SCNC: 4.9 MMOL/L — SIGNIFICANT CHANGE UP (ref 3.5–5.3)
PROT UR-MCNC: NEGATIVE MG/DL — SIGNIFICANT CHANGE UP
RBC # BLD: 3.67 M/UL — LOW (ref 3.8–5.2)
RBC # FLD: 13.1 % — SIGNIFICANT CHANGE UP (ref 10.3–14.5)
RBC CASTS # UR COMP ASSIST: 6 /HPF — HIGH (ref 0–4)
SODIUM SERPL-SCNC: 128 MMOL/L — LOW (ref 135–145)
SP GR SPEC: 1.01 — SIGNIFICANT CHANGE UP (ref 1.01–1.02)
UROBILINOGEN FLD QL: NEGATIVE MG/DL — SIGNIFICANT CHANGE UP
WBC # BLD: 9.9 K/UL — SIGNIFICANT CHANGE UP (ref 3.8–10.5)
WBC # FLD AUTO: 9.9 K/UL — SIGNIFICANT CHANGE UP (ref 3.8–10.5)
WBC UR QL: 111 /HPF — HIGH (ref 0–5)

## 2017-08-15 RX ORDER — CEFTRIAXONE 500 MG/1
INJECTION, POWDER, FOR SOLUTION INTRAMUSCULAR; INTRAVENOUS
Qty: 0 | Refills: 0 | Status: DISCONTINUED | OUTPATIENT
Start: 2017-08-15 | End: 2017-08-17

## 2017-08-15 RX ORDER — ONDANSETRON 8 MG/1
4 TABLET, FILM COATED ORAL ONCE
Qty: 0 | Refills: 0 | Status: COMPLETED | OUTPATIENT
Start: 2017-08-15 | End: 2017-08-15

## 2017-08-15 RX ORDER — CEFTRIAXONE 500 MG/1
1 INJECTION, POWDER, FOR SOLUTION INTRAMUSCULAR; INTRAVENOUS ONCE
Qty: 0 | Refills: 0 | Status: COMPLETED | OUTPATIENT
Start: 2017-08-15 | End: 2017-08-15

## 2017-08-15 RX ORDER — CEFTRIAXONE 500 MG/1
1 INJECTION, POWDER, FOR SOLUTION INTRAMUSCULAR; INTRAVENOUS ONCE
Qty: 0 | Refills: 0 | Status: DISCONTINUED | OUTPATIENT
Start: 2017-08-15 | End: 2017-08-15

## 2017-08-15 RX ORDER — CEFTRIAXONE 500 MG/1
1 INJECTION, POWDER, FOR SOLUTION INTRAMUSCULAR; INTRAVENOUS EVERY 24 HOURS
Qty: 0 | Refills: 0 | Status: DISCONTINUED | OUTPATIENT
Start: 2017-08-16 | End: 2017-08-17

## 2017-08-15 RX ADMIN — Medication 0.5 MILLIGRAM(S): at 06:23

## 2017-08-15 RX ADMIN — HEPARIN SODIUM 5000 UNIT(S): 5000 INJECTION INTRAVENOUS; SUBCUTANEOUS at 18:24

## 2017-08-15 RX ADMIN — HEPARIN SODIUM 5000 UNIT(S): 5000 INJECTION INTRAVENOUS; SUBCUTANEOUS at 06:23

## 2017-08-15 RX ADMIN — Medication 325 MILLIGRAM(S): at 13:18

## 2017-08-15 RX ADMIN — Medication 50 MICROGRAM(S): at 06:25

## 2017-08-15 RX ADMIN — DORZOLAMIDE HYDROCHLORIDE TIMOLOL MALEATE 1 DROP(S): 20; 5 SOLUTION/ DROPS OPHTHALMIC at 06:22

## 2017-08-15 RX ADMIN — Medication 500 MILLIGRAM(S): at 18:25

## 2017-08-15 RX ADMIN — SIMVASTATIN 20 MILLIGRAM(S): 20 TABLET, FILM COATED ORAL at 21:32

## 2017-08-15 RX ADMIN — ONDANSETRON 4 MILLIGRAM(S): 8 TABLET, FILM COATED ORAL at 23:28

## 2017-08-15 RX ADMIN — Medication 500 MILLIGRAM(S): at 06:26

## 2017-08-15 RX ADMIN — BRIMONIDINE TARTRATE 1 DROP(S): 2 SOLUTION/ DROPS OPHTHALMIC at 06:22

## 2017-08-15 RX ADMIN — BRIMONIDINE TARTRATE 1 DROP(S): 2 SOLUTION/ DROPS OPHTHALMIC at 18:23

## 2017-08-15 RX ADMIN — Medication 20 MILLIGRAM(S): at 06:25

## 2017-08-15 RX ADMIN — LATANOPROST 1 DROP(S): 0.05 SOLUTION/ DROPS OPHTHALMIC; TOPICAL at 21:32

## 2017-08-15 RX ADMIN — Medication 1 MILLIGRAM(S): at 13:18

## 2017-08-15 RX ADMIN — Medication 0.5 MILLIGRAM(S): at 18:23

## 2017-08-15 RX ADMIN — Medication 25 MILLIGRAM(S): at 06:23

## 2017-08-15 RX ADMIN — Medication 25 MILLIGRAM(S): at 18:24

## 2017-08-15 RX ADMIN — CEFTRIAXONE 100 GRAM(S): 500 INJECTION, POWDER, FOR SOLUTION INTRAMUSCULAR; INTRAVENOUS at 08:12

## 2017-08-15 RX ADMIN — DORZOLAMIDE HYDROCHLORIDE TIMOLOL MALEATE 1 DROP(S): 20; 5 SOLUTION/ DROPS OPHTHALMIC at 18:24

## 2017-08-15 NOTE — PROGRESS NOTE ADULT - ASSESSMENT
PT  WITH  WEAKNESS,  HYPONATREMIA, LASIX  ON  HOLD,  H/O  DIASTOLIC  CHF.  ECHO    H/O  SEVERE  PHTN   SEEN BY CARD    DVT PPX. PT  EVAL,    bed bound

## 2017-08-15 NOTE — PROGRESS NOTE ADULT - ASSESSMENT
85 yo f right sided chf with hyponatremia  lasix 20mg po daily  na improving anti-emetics for N/V as needed

## 2017-08-16 LAB
ANION GAP SERPL CALC-SCNC: 11 MMOL/L — SIGNIFICANT CHANGE UP (ref 5–17)
BUN SERPL-MCNC: 18 MG/DL — SIGNIFICANT CHANGE UP (ref 7–23)
CALCIUM SERPL-MCNC: 8.5 MG/DL — SIGNIFICANT CHANGE UP (ref 8.4–10.5)
CHLORIDE SERPL-SCNC: 85 MMOL/L — LOW (ref 96–108)
CO2 SERPL-SCNC: 31 MMOL/L — SIGNIFICANT CHANGE UP (ref 22–31)
CREAT SERPL-MCNC: 0.76 MG/DL — SIGNIFICANT CHANGE UP (ref 0.5–1.3)
CULTURE RESULTS: SIGNIFICANT CHANGE UP
GLUCOSE SERPL-MCNC: 97 MG/DL — SIGNIFICANT CHANGE UP (ref 70–99)
HCT VFR BLD CALC: 34.7 % — SIGNIFICANT CHANGE UP (ref 34.5–45)
HGB BLD-MCNC: 11.5 G/DL — SIGNIFICANT CHANGE UP (ref 11.5–15.5)
MCHC RBC-ENTMCNC: 30 PG — SIGNIFICANT CHANGE UP (ref 27–34)
MCHC RBC-ENTMCNC: 33.1 GM/DL — SIGNIFICANT CHANGE UP (ref 32–36)
MCV RBC AUTO: 90.6 FL — SIGNIFICANT CHANGE UP (ref 80–100)
PLATELET # BLD AUTO: 134 K/UL — LOW (ref 150–400)
POTASSIUM SERPL-MCNC: 4.7 MMOL/L — SIGNIFICANT CHANGE UP (ref 3.5–5.3)
POTASSIUM SERPL-SCNC: 4.7 MMOL/L — SIGNIFICANT CHANGE UP (ref 3.5–5.3)
RBC # BLD: 3.83 M/UL — SIGNIFICANT CHANGE UP (ref 3.8–5.2)
RBC # FLD: 13 % — SIGNIFICANT CHANGE UP (ref 10.3–14.5)
SODIUM SERPL-SCNC: 127 MMOL/L — LOW (ref 135–145)
SPECIMEN SOURCE: SIGNIFICANT CHANGE UP
WBC # BLD: 7.92 K/UL — SIGNIFICANT CHANGE UP (ref 3.8–10.5)
WBC # FLD AUTO: 7.92 K/UL — SIGNIFICANT CHANGE UP (ref 3.8–10.5)

## 2017-08-16 RX ADMIN — BRIMONIDINE TARTRATE 1 DROP(S): 2 SOLUTION/ DROPS OPHTHALMIC at 06:05

## 2017-08-16 RX ADMIN — Medication 25 MILLIGRAM(S): at 18:30

## 2017-08-16 RX ADMIN — Medication 1 MILLIGRAM(S): at 11:59

## 2017-08-16 RX ADMIN — Medication 0.5 MILLIGRAM(S): at 07:09

## 2017-08-16 RX ADMIN — HEPARIN SODIUM 5000 UNIT(S): 5000 INJECTION INTRAVENOUS; SUBCUTANEOUS at 06:05

## 2017-08-16 RX ADMIN — Medication 500 MILLIGRAM(S): at 18:22

## 2017-08-16 RX ADMIN — CEFTRIAXONE 100 GRAM(S): 500 INJECTION, POWDER, FOR SOLUTION INTRAMUSCULAR; INTRAVENOUS at 08:33

## 2017-08-16 RX ADMIN — DORZOLAMIDE HYDROCHLORIDE TIMOLOL MALEATE 1 DROP(S): 20; 5 SOLUTION/ DROPS OPHTHALMIC at 18:21

## 2017-08-16 RX ADMIN — Medication 50 MICROGRAM(S): at 06:05

## 2017-08-16 RX ADMIN — Medication 0.5 MILLIGRAM(S): at 18:21

## 2017-08-16 RX ADMIN — LATANOPROST 1 DROP(S): 0.05 SOLUTION/ DROPS OPHTHALMIC; TOPICAL at 21:48

## 2017-08-16 RX ADMIN — Medication 500 MILLIGRAM(S): at 06:05

## 2017-08-16 RX ADMIN — Medication 325 MILLIGRAM(S): at 11:59

## 2017-08-16 RX ADMIN — SIMVASTATIN 20 MILLIGRAM(S): 20 TABLET, FILM COATED ORAL at 21:48

## 2017-08-16 RX ADMIN — Medication 20 MILLIGRAM(S): at 06:05

## 2017-08-16 RX ADMIN — BRIMONIDINE TARTRATE 1 DROP(S): 2 SOLUTION/ DROPS OPHTHALMIC at 18:20

## 2017-08-16 RX ADMIN — DORZOLAMIDE HYDROCHLORIDE TIMOLOL MALEATE 1 DROP(S): 20; 5 SOLUTION/ DROPS OPHTHALMIC at 06:05

## 2017-08-16 RX ADMIN — Medication 25 MILLIGRAM(S): at 06:05

## 2017-08-16 RX ADMIN — HEPARIN SODIUM 5000 UNIT(S): 5000 INJECTION INTRAVENOUS; SUBCUTANEOUS at 18:21

## 2017-08-16 NOTE — PROGRESS NOTE ADULT - ASSESSMENT
83 yo f right sided chf with hyponatremia  lasix 20mg po daily  cr steady at baseline again.   may need to increase lasix dose.   i suspect she will likely live with low sodium level in this range.   fluid restrict one liter per day

## 2017-08-17 VITALS
TEMPERATURE: 97 F | SYSTOLIC BLOOD PRESSURE: 126 MMHG | RESPIRATION RATE: 18 BRPM | DIASTOLIC BLOOD PRESSURE: 76 MMHG | HEART RATE: 82 BPM | OXYGEN SATURATION: 97 %

## 2017-08-17 LAB
ANION GAP SERPL CALC-SCNC: 13 MMOL/L — SIGNIFICANT CHANGE UP (ref 5–17)
BUN SERPL-MCNC: 17 MG/DL — SIGNIFICANT CHANGE UP (ref 7–23)
CALCIUM SERPL-MCNC: 9 MG/DL — SIGNIFICANT CHANGE UP (ref 8.4–10.5)
CHLORIDE SERPL-SCNC: 84 MMOL/L — LOW (ref 96–108)
CO2 SERPL-SCNC: 35 MMOL/L — HIGH (ref 22–31)
CREAT SERPL-MCNC: 0.74 MG/DL — SIGNIFICANT CHANGE UP (ref 0.5–1.3)
GLUCOSE SERPL-MCNC: 120 MG/DL — HIGH (ref 70–99)
POTASSIUM SERPL-MCNC: 4.4 MMOL/L — SIGNIFICANT CHANGE UP (ref 3.5–5.3)
POTASSIUM SERPL-SCNC: 4.4 MMOL/L — SIGNIFICANT CHANGE UP (ref 3.5–5.3)
SODIUM SERPL-SCNC: 132 MMOL/L — LOW (ref 135–145)

## 2017-08-17 RX ORDER — OXYMORPHONE HYDROCHLORIDE 10 MG/1
1 TABLET, EXTENDED RELEASE ORAL
Qty: 0 | Refills: 0 | COMMUNITY
Start: 2017-08-17

## 2017-08-17 RX ORDER — ASPIRIN/CALCIUM CARB/MAGNESIUM 324 MG
81 TABLET ORAL DAILY
Qty: 0 | Refills: 0 | Status: DISCONTINUED | OUTPATIENT
Start: 2017-08-17 | End: 2017-08-17

## 2017-08-17 RX ORDER — METOPROLOL TARTRATE 50 MG
1 TABLET ORAL
Qty: 28 | Refills: 0 | OUTPATIENT
Start: 2017-08-17 | End: 2017-08-31

## 2017-08-17 RX ADMIN — Medication 0.5 MILLIGRAM(S): at 06:57

## 2017-08-17 RX ADMIN — Medication 325 MILLIGRAM(S): at 12:16

## 2017-08-17 RX ADMIN — Medication 500 MILLIGRAM(S): at 06:58

## 2017-08-17 RX ADMIN — Medication 50 MICROGRAM(S): at 06:56

## 2017-08-17 RX ADMIN — Medication 81 MILLIGRAM(S): at 12:16

## 2017-08-17 RX ADMIN — BRIMONIDINE TARTRATE 1 DROP(S): 2 SOLUTION/ DROPS OPHTHALMIC at 06:57

## 2017-08-17 RX ADMIN — Medication 25 MILLIGRAM(S): at 06:57

## 2017-08-17 RX ADMIN — Medication 20 MILLIGRAM(S): at 06:57

## 2017-08-17 RX ADMIN — DORZOLAMIDE HYDROCHLORIDE TIMOLOL MALEATE 1 DROP(S): 20; 5 SOLUTION/ DROPS OPHTHALMIC at 06:57

## 2017-08-17 RX ADMIN — Medication 1 MILLIGRAM(S): at 12:16

## 2017-08-17 RX ADMIN — HEPARIN SODIUM 5000 UNIT(S): 5000 INJECTION INTRAVENOUS; SUBCUTANEOUS at 06:58

## 2017-08-17 RX ADMIN — CEFTRIAXONE 100 GRAM(S): 500 INJECTION, POWDER, FOR SOLUTION INTRAMUSCULAR; INTRAVENOUS at 06:59

## 2017-08-17 NOTE — CHART NOTE - NSCHARTNOTEFT_GEN_A_CORE
Na+ 132 this am. D/w attending, Dr. Cabral. Discharge pt today. D/w pt daughter Vivi re: discharge today. She states she  will bring portable home oxygen today.     Katty Nelson NP [facility] [id number] Na+ 132 this am. D/w attending, Dr. Cabral. Discharge pt today. Continue Aspirin if platelets > 100K. D/w pt daughter Vivi re: discharge today. She states she  will bring portable home oxygen today.     Katty Nelson [facility] [id number]

## 2017-08-17 NOTE — PROGRESS NOTE ADULT - SUBJECTIVE AND OBJECTIVE BOX
SUBJECTIVE / OVERNIGHT EVENTS: No nausea, vomiting or diarrhea, no fever or chills, no dizziness or chest pain, no dysuria or hematuria .      CAPILLARY BLOOD GLUCOSE        I&O's Summary    10 Aug 2017 07:01  -  11 Aug 2017 07:00  --------------------------------------------------------  IN: 630 mL / OUT: 1100 mL / NET: -470 mL        PHYSICAL EXAM:  HEAD:  Atraumatic, Normocephalic  EYES: EOMI, PERRLA, conjunctiva and sclera clear  NECK: Supple, No JVD  CHEST/LUNG: Clear to auscultation bilaterally; No wheeze  HEART: Regular rate and rhythm; No murmurs, rubs, or gallops  ABDOMEN: Soft, Nontender, Nondistended; Bowel sounds present  EXTREMITIES:  2+ Peripheral Pulses, No clubbing, cyanosis, or edema  PSYCH: AAOx3  NEUROLOGY: non-focal  SKIN: No rashes or lesions    MEDICATIONS  (STANDING):  sulfaSALAzine 500 milliGRAM(s) Oral two times a day  latanoprost 0.005% Ophthalmic Solution 1 Drop(s) Both EYES at bedtime  buDESOnide   0.5 milliGRAM(s) Respule 0.5 milliGRAM(s) Inhalation two times a day  diltiazem    milliGRAM(s) Oral daily  gabapentin 300 milliGRAM(s) Oral two times a day  citalopram 10 milliGRAM(s) Oral daily  simvastatin 20 milliGRAM(s) Oral at bedtime  metoprolol 25 milliGRAM(s) Oral two times a day  ferrous    sulfate 325 milliGRAM(s) Oral daily  brimonidine 0.2% Ophthalmic Solution 1 Drop(s) Both EYES two times a day  dorzolamide 2%/timolol 0.5% Ophthalmic Solution 1 Drop(s) Both EYES two times a day  levothyroxine 50 MICROGram(s) Oral daily  folic acid 1 milliGRAM(s) Oral daily  heparin  Injectable 5000 Unit(s) SubCutaneous every 12 hours  oxymorphone ER 10 milliGRAM(s) Oral every 12 hours    MEDICATIONS  (PRN):  ALPRAZolam 0.125 milliGRAM(s) Oral every 8 hours PRN anxiety  ALBUTerol/ipratropium for Nebulization. 3 milliLiter(s) Nebulizer once PRN Bronchospasm      LABS:                        11.8   6.41  )-----------( 106      ( 11 Aug 2017 07:12 )             34.4     08-11    125<L>  |  83<L>  |  13  ----------------------------<  97  4.5   |  32<H>  |  1.06    Ca    8.8      11 Aug 2017 07:12    TPro  6.3  /  Alb  3.1<L>  /  TBili  0.4  /  DBili  x   /  AST  24  /  ALT  18  /  AlkPhos  75  08-10    PT/INR - ( 09 Aug 2017 16:28 )   PT: 12.3 sec;   INR: 1.13 ratio         PTT - ( 09 Aug 2017 16:28 )  PTT:39.3 sec  CARDIAC MARKERS ( 09 Aug 2017 16:30 )  x     / <0.01 ng/mL / x     / x     / x                    Thyroid Stimulating Hormone, Serum: 1.40 uIU/mL (08-10 @ 12:13)      Cultures:    EKG:    Radiological Studies:    Consultant(s) Notes Reviewed:      Care Discussed with Consultants/Other Providers:
- Patient seen and examined.  - In summary, patient is a 84y year old woman who presented with low Na, chf. (10 Aug 2017 12:42)  - Today, patient is without complaints.         *****MEDICATIONS:    MEDICATIONS  (STANDING):  sulfaSALAzine 500 milliGRAM(s) Oral two times a day  latanoprost 0.005% Ophthalmic Solution 1 Drop(s) Both EYES at bedtime  buDESOnide   0.5 milliGRAM(s) Respule 0.5 milliGRAM(s) Inhalation two times a day  diltiazem    milliGRAM(s) Oral daily  gabapentin 300 milliGRAM(s) Oral two times a day  citalopram 10 milliGRAM(s) Oral daily  simvastatin 20 milliGRAM(s) Oral at bedtime  metoprolol 25 milliGRAM(s) Oral two times a day  ferrous    sulfate 325 milliGRAM(s) Oral daily  brimonidine 0.2% Ophthalmic Solution 1 Drop(s) Both EYES two times a day  dorzolamide 2%/timolol 0.5% Ophthalmic Solution 1 Drop(s) Both EYES two times a day  levothyroxine 50 MICROGram(s) Oral daily  folic acid 1 milliGRAM(s) Oral daily  heparin  Injectable 5000 Unit(s) SubCutaneous every 12 hours  oxymorphone ER 10 milliGRAM(s) Oral every 12 hours    MEDICATIONS  (PRN):  ALPRAZolam 0.125 milliGRAM(s) Oral every 8 hours PRN anxiety  ALBUTerol/ipratropium for Nebulization. 3 milliLiter(s) Nebulizer once PRN Bronchospasm           ***** REVIEW OF SYSTEM:  GEN: no fever, no chills, no pain  RESP: no SOB, no cough, no sputum  CVS: no chest pain, no palpitations, no edema  GI: no abdominal pain, no nausea, no vomiting, no constipation, no diarrhea  : no dysurea, no frequency  NEURO: no headache, no diziness  PSYCH: no depression, not anxious  Derm : no itching, no rash         ***** VITAL SIGNS:  T(F): 97.4 (17 @ 04:20), Max: 97.7 (08-10-17 @ 20:27)  HR: 62 (17 @ 04:20) (59 - 67)  BP: 107/67 (17 @ 04:20) (104/56 - 151/74)  RR: 18 (17 @ 04:20) (18 - 18)  SpO2: 92% (17 @ 04:20) (92% - 97%)  Wt(kg): --  ,   I&O's Summary    10 Aug 2017 07:01  -  11 Aug 2017 07:00  --------------------------------------------------------  IN: 630 mL / OUT: 1100 mL / NET: -470 mL             *****PHYSICAL EXAM:  GEN: A&O X 3 , NAD , comfortable  HEENT: NCAT, EOMI, MMM, no icterus  NECK: Supple, No JVD  CVS: S1S2 , regular , No M/R/G appreciated  PULM: CTA B/L,  no W/R/R appreciated  ABD.: soft. non tender, non distended,  bowel sounds present  Extrem: intact pulses , no edema noted  Derm: No rash or ecchymosis noted  PSYCH: normal mood, no depression, not anxious         *****LAB AND IMAGIN.8   6.41  )-----------( 106      ( 11 Aug 2017 07:12 )             34.4               08-11    125<L>  |  83<L>  |  13  ----------------------------<  97  4.5   |  32<H>  |  1.06    Ca    8.8      11 Aug 2017 07:12    TPro  6.3  /  Alb  3.1<L>  /  TBili  0.4  /  DBili  x   /  AST  24  /  ALT  18  /  AlkPhos  75  08-10    PT/INR - ( 09 Aug 2017 16:28 )   PT: 12.3 sec;   INR: 1.13 ratio         PTT - ( 09 Aug 2017 16:28 )  PTT:39.3 sec       CARDIAC MARKERS ( 09 Aug 2017 16:30 )  x     / <0.01 ng/mL / x     / x     / x                    [All pertinent recent Imaging/Reports reviewed]         *****A S S E S S M E N T   A N D   P L A N :  84F with CHF, non compliant with lasix, low NA, low platelets  hold asa, heparin  R sided chf due to pulmonary htn   continue cardiac meds,.  repeat echo  renal f/u  lasix for O>I        __________________________  A. JULIANNE Cabral.
- Patient seen and examined.  - In summary, patient is a 84y year old woman who presented with low Na, chf. (10 Aug 2017 12:42)  - Today, patient is without complaints.         *****MEDICATIONS:    MEDICATIONS  (STANDING):  sulfaSALAzine 500 milliGRAM(s) Oral two times a day  latanoprost 0.005% Ophthalmic Solution 1 Drop(s) Both EYES at bedtime  buDESOnide   0.5 milliGRAM(s) Respule 0.5 milliGRAM(s) Inhalation two times a day  diltiazem    milliGRAM(s) Oral daily  gabapentin 300 milliGRAM(s) Oral two times a day  simvastatin 20 milliGRAM(s) Oral at bedtime  metoprolol 25 milliGRAM(s) Oral two times a day  ferrous    sulfate 325 milliGRAM(s) Oral daily  brimonidine 0.2% Ophthalmic Solution 1 Drop(s) Both EYES two times a day  dorzolamide 2%/timolol 0.5% Ophthalmic Solution 1 Drop(s) Both EYES two times a day  levothyroxine 50 MICROGram(s) Oral daily  folic acid 1 milliGRAM(s) Oral daily  heparin  Injectable 5000 Unit(s) SubCutaneous every 12 hours  oxymorphone ER 10 milliGRAM(s) Oral every 12 hours    MEDICATIONS  (PRN):  ALPRAZolam 0.125 milliGRAM(s) Oral every 8 hours PRN anxiety  ALBUTerol/ipratropium for Nebulization. 3 milliLiter(s) Nebulizer once PRN Bronchospasm             ***** REVIEW OF SYSTEM:  GEN: no fever, no chills, no pain  RESP: no SOB, no cough, no sputum  CVS: no chest pain, no palpitations, no edema  GI: no abdominal pain, no nausea, no vomiting, no constipation, no diarrhea  : no dysurea, no frequency  NEURO: no headache, no diziness  PSYCH: no depression, not anxious  Derm : no itching, no rash         ***** VITAL SIGNS:    T(F): 97.7 (17 @ 03:43), Max: 98.7 (17 @ 14:10)  HR: 66 (17 @ 08:14) (55 - 71)  BP: 110/60 (17 @ 08:14) (94/60 - 116/70)  RR: 18 (17 @ 03:43) (17 - 18)  SpO2: 96% (17 @ 03:43) (94% - 96%)  Wt(kg): --  ,   I&O's Summary    11 Aug 2017 07:01  -  12 Aug 2017 07:00  --------------------------------------------------------  IN: 630 mL / OUT: 350 mL / NET: 280 mL                 *****PHYSICAL EXAM:  GEN: A&O X 3 , NAD , comfortable  HEENT: NCAT, EOMI, MMM, no icterus  NECK: Supple, No JVD  CVS: S1S2 , regular , No M/R/G appreciated  PULM: CTA B/L,  no W/R/R appreciated  ABD.: soft. non tender, non distended,  bowel sounds present  Extrem: intact pulses , no edema noted  Derm: No rash or ecchymosis noted  PSYCH: normal mood, no depression, not anxious         *****LAB AND IMAGIN.8   6.41  )-----------( 106      ( 11 Aug 2017 07:12 )             34.4               12    125<L>  |  84<L>  |  19  ----------------------------<  109<H>  5.0   |  32<H>  |  1.22    Ca    9.5      12 Aug 2017 08:48    TPro  6.5  /  Alb  3.5  /  TBili  0.3  /  DBili  x   /  AST  23  /  ALT  15  /  AlkPhos  85      [All pertinent recent Imaging/Reports reviewed]    < from: TTE with Doppler (w/Cont) (17 @ 20:16) >  Conclusions:  1. Mild mitral annular calcification, otherwise normal  mitral valve. Minimal mitral regurgitation.  2. Calcified trileaflet aortic valve with normal opening.  Minimal aortic regurgitation.  3. Endocardial visualization enhanced with intravenous  injection of echo contrast (Definity). Grossly normal left  ventricular systolic function. Septal flattening consistent  with right ventricular overload.  4. Mild diastolic dysfunction (Stage I).  5. The right ventricle is not well visualized on axis;  right ventrilce appears mildly enlarged with decreased  systolic function.  6. Estimated pulmonary artery systolic pressure equals 80  mm Hg, assuming right atrial pressure equals 8 mm Hg,  consistent with severe pulmonary pressures.  *** Compared with echocardiogram of 2015, estimated PA  systolic pressure is significantly higher on today's study.  Findings are otherwise similar.    < end of copied text >         *****A S S E S S M E N T   A N D   P L A N :  84F with CHF, non compliant with lasix, low NA, low platelets  hold asa, heparin  R sided chf due to pulmonary htn   continue cardiac meds,.  repeat echo  renal f/u  defer lasix to renal      __________________________  A. JULIANNE Cabral.
- Patient seen and examined.  - In summary, patient is a 84y year old woman who presented with low Na, chf. (10 Aug 2017 12:42)  - Today, patient is without complaints.         *****MEDICATIONS:    MEDICATIONS  (STANDING):  sulfaSALAzine 500 milliGRAM(s) Oral two times a day  latanoprost 0.005% Ophthalmic Solution 1 Drop(s) Both EYES at bedtime  buDESOnide   0.5 milliGRAM(s) Respule 0.5 milliGRAM(s) Inhalation two times a day  diltiazem    milliGRAM(s) Oral daily  gabapentin 300 milliGRAM(s) Oral two times a day  simvastatin 20 milliGRAM(s) Oral at bedtime  metoprolol 25 milliGRAM(s) Oral two times a day  ferrous    sulfate 325 milliGRAM(s) Oral daily  brimonidine 0.2% Ophthalmic Solution 1 Drop(s) Both EYES two times a day  dorzolamide 2%/timolol 0.5% Ophthalmic Solution 1 Drop(s) Both EYES two times a day  levothyroxine 50 MICROGram(s) Oral daily  folic acid 1 milliGRAM(s) Oral daily  heparin  Injectable 5000 Unit(s) SubCutaneous every 12 hours  oxymorphone ER 10 milliGRAM(s) Oral every 12 hours    MEDICATIONS  (PRN):  ALPRAZolam 0.125 milliGRAM(s) Oral every 8 hours PRN anxiety  ALBUTerol/ipratropium for Nebulization. 3 milliLiter(s) Nebulizer once PRN Bronchospasm           ***** REVIEW OF SYSTEM:  GEN: no fever, no chills, no pain  RESP: no SOB, no cough, no sputum  CVS: no chest pain, no palpitations, no edema  GI: no abdominal pain, no nausea, no vomiting, no constipation, no diarrhea  : no dysurea, no frequency  NEURO: no headache, no diziness  PSYCH: no depression, not anxious  Derm : no itching, no rash         ***** VITAL SIGNS:    T(F): 98.6 (17 @ 03:49), Max: 98.6 (17 @ 03:49)  HR: 64 (17 @ 07:51) (54 - 89)  BP: 116/76 (17 @ 07:51) (94/61 - 133/79)  RR: 18 (17 @ 03:49) (17 - 19)  SpO2: 96% (17 @ 03:49) (87% - 96%)  Wt(kg): --  ,   I&O's Summary    12 Aug 2017 07:01  -  13 Aug 2017 07:00  --------------------------------------------------------  IN: 770 mL / OUT: 250 mL / NET: 520 mL                   *****PHYSICAL EXAM:  GEN: A&O X 3 , NAD , comfortable  HEENT: NCAT, EOMI, MMM, no icterus  NECK: Supple, No JVD  CVS: S1S2 , regular , No M/R/G appreciated  PULM: CTA B/L,  no W/R/R appreciated  ABD.: soft. non tender, non distended,  bowel sounds present  Extrem: intact pulses , no edema noted  Derm: No rash or ecchymosis noted  PSYCH: normal mood, no depression, not anxious         *****LAB AND IMAGIN-13    122<L>  |  82<L>  |  24<H>  ----------------------------<  126<H>  4.9   |  30  |  1.10    Ca    8.9      13 Aug 2017 05:47    TPro  6.5  /  Alb  3.5  /  TBili  0.3  /  DBili  x   /  AST  23  /  ALT  15  /  AlkPhos  85  -      [All pertinent recent Imaging/Reports reviewed]    < from: TTE with Doppler (w/Cont) (17 @ 20:16) >  Conclusions:  1. Mild mitral annular calcification, otherwise normal  mitral valve. Minimal mitral regurgitation.  2. Calcified trileaflet aortic valve with normal opening.  Minimal aortic regurgitation.  3. Endocardial visualization enhanced with intravenous  injection of echo contrast (Definity). Grossly normal left  ventricular systolic function. Septal flattening consistent  with right ventricular overload.  4. Mild diastolic dysfunction (Stage I).  5. The right ventricle is not well visualized on axis;  right ventrilce appears mildly enlarged with decreased  systolic function.  6. Estimated pulmonary artery systolic pressure equals 80  mm Hg, assuming right atrial pressure equals 8 mm Hg,  consistent with severe pulmonary pressures.  *** Compared with echocardiogram of 2015, estimated PA  systolic pressure is significantly higher on today's study.  Findings are otherwise similar.    < end of copied text >         *****A S S E S S M E N T   A N D   P L A N :  84F with acute on chronic diastolic CHF, non compliant with lasix, low NA, low platelets  hold asa, heparin  R sided chf due to pulmonary htn   has been hypotensive at times  DC long acting CCB (got this AM)  continue BB with parameters  echo normal LV  renal f/u re Na      __________________________  A. JULIANNE Cabral.
- Patient seen and examined.  - In summary, patient is a 84y year old woman who presented with low Na, chf. (10 Aug 2017 12:42)  - Today, patient is without complaints.         *****MEDICATIONS:    MEDICATIONS  (STANDING):  sulfaSALAzine 500 milliGRAM(s) Oral two times a day  latanoprost 0.005% Ophthalmic Solution 1 Drop(s) Both EYES at bedtime  buDESOnide   0.5 milliGRAM(s) Respule 0.5 milliGRAM(s) Inhalation two times a day  gabapentin 300 milliGRAM(s) Oral two times a day  simvastatin 20 milliGRAM(s) Oral at bedtime  metoprolol 25 milliGRAM(s) Oral two times a day  ferrous    sulfate 325 milliGRAM(s) Oral daily  brimonidine 0.2% Ophthalmic Solution 1 Drop(s) Both EYES two times a day  dorzolamide 2%/timolol 0.5% Ophthalmic Solution 1 Drop(s) Both EYES two times a day  levothyroxine 50 MICROGram(s) Oral daily  folic acid 1 milliGRAM(s) Oral daily  heparin  Injectable 5000 Unit(s) SubCutaneous every 12 hours  oxymorphone ER 10 milliGRAM(s) Oral every 12 hours    MEDICATIONS  (PRN):  ALPRAZolam 0.125 milliGRAM(s) Oral every 8 hours PRN anxiety  ALBUTerol/ipratropium for Nebulization. 3 milliLiter(s) Nebulizer once PRN Bronchospasm             ***** REVIEW OF SYSTEM:  GEN: no fever, no chills, no pain  RESP: no SOB, no cough, no sputum  CVS: no chest pain, no palpitations, no edema  GI: no abdominal pain, no nausea, no vomiting, no constipation, no diarrhea  : no dysurea, no frequency  NEURO: no headache, no diziness  PSYCH: no depression, not anxious  Derm : no itching, no rash         ***** VITAL SIGNS:    T(F): 98.5 (17 @ 03:27), Max: 98.9 (17 @ 21:02)  HR: 68 (17 @ 03:27) (66 - 81)  BP: 109/73 (17 @ 03:27) (109/73 - 121/74)  RR: 18 (17 @ 03:27) (18 - 19)  SpO2: 96% (17 @ 03:27) (94% - 96%)  Wt(kg): --  ,   I&O's Summary    13 Aug 2017 07:01  -  14 Aug 2017 07:00  --------------------------------------------------------  IN: 120 mL / OUT: 880 mL / NET: -760 mL                 *****PHYSICAL EXAM:  GEN: A&O X 3 , NAD , comfortable  HEENT: NCAT, EOMI, MMM, no icterus  NECK: Supple, No JVD  CVS: S1S2 , regular , No M/R/G appreciated  PULM: CTA B/L,  no W/R/R appreciated  ABD.: soft. non tender, non distended,  bowel sounds present  Extrem: intact pulses , no edema noted  Derm: No rash or ecchymosis noted  PSYCH: normal mood, no depression, not anxious         *****LAB AND IMAGIN-14    123<L>  |  82<L>  |  24<H>  ----------------------------<  126<H>  5.4<H>   |  28  |  0.84    Ca    9.2      14 Aug 2017 07:11      [All pertinent recent Imaging/Reports reviewed]    < from: TTE with Doppler (w/Cont) (17 @ 20:16) >  Conclusions:  1. Mild mitral annular calcification, otherwise normal  mitral valve. Minimal mitral regurgitation.  2. Calcified trileaflet aortic valve with normal opening.  Minimal aortic regurgitation.  3. Endocardial visualization enhanced with intravenous  injection of echo contrast (Definity). Grossly normal left  ventricular systolic function. Septal flattening consistent  with right ventricular overload.  4. Mild diastolic dysfunction (Stage I).  5. The right ventricle is not well visualized on axis;  right ventrilce appears mildly enlarged with decreased  systolic function.  6. Estimated pulmonary artery systolic pressure equals 80  mm Hg, assuming right atrial pressure equals 8 mm Hg,  consistent with severe pulmonary pressures.  *** Compared with echocardiogram of 2015, estimated PA  systolic pressure is significantly higher on today's study.  Findings are otherwise similar.    < end of copied text >         *****A S S E S S M E N T   A N D   P L A N :  84F with acute on chronic diastolic CHF, non compliant with lasix, low NA, low platelets  hold asa, heparin  R sided chf due to pulmonary htn   BP more stable  off CCB  continue BB with parameters  echo normal LV  renal f/u re Na      __________________________  A. JULIANNE Cabral.
- Patient seen and examined.  - In summary, patient is a 84y year old woman who presented with low Na, chf. (10 Aug 2017 12:42)  - Today, patient is without complaints.         *****MEDICATIONS:    MEDICATIONS  (STANDING):  sulfaSALAzine 500 milliGRAM(s) Oral two times a day  latanoprost 0.005% Ophthalmic Solution 1 Drop(s) Both EYES at bedtime  buDESOnide   0.5 milliGRAM(s) Respule 0.5 milliGRAM(s) Inhalation two times a day  simvastatin 20 milliGRAM(s) Oral at bedtime  metoprolol 25 milliGRAM(s) Oral two times a day  ferrous    sulfate 325 milliGRAM(s) Oral daily  brimonidine 0.2% Ophthalmic Solution 1 Drop(s) Both EYES two times a day  dorzolamide 2%/timolol 0.5% Ophthalmic Solution 1 Drop(s) Both EYES two times a day  levothyroxine 50 MICROGram(s) Oral daily  folic acid 1 milliGRAM(s) Oral daily  heparin  Injectable 5000 Unit(s) SubCutaneous every 12 hours  oxymorphone ER 10 milliGRAM(s) Oral every 12 hours  furosemide    Tablet 20 milliGRAM(s) Oral daily  cefTRIAXone   IVPB   IV Intermittent     MEDICATIONS  (PRN):  ALPRAZolam 0.125 milliGRAM(s) Oral every 8 hours PRN anxiety  ALBUTerol/ipratropium for Nebulization. 3 milliLiter(s) Nebulizer once PRN Bronchospasm               ***** REVIEW OF SYSTEM:  GEN: no fever, no chills, no pain  RESP: no SOB, no cough, no sputum  CVS: no chest pain, no palpitations, no edema  GI: no abdominal pain, no nausea, no vomiting, no constipation, no diarrhea  : no dysurea, no frequency  NEURO: no headache, no diziness  PSYCH: no depression, not anxious  Derm : no itching, no rash         ***** VITAL SIGNS:    T(F): 98.9 (08-15-17 @ 05:47), Max: 98.9 (17 @ 13:53)  HR: 81 (08-15-17 @ 05:47) (67 - 81)  BP: 115/72 (08-15-17 @ 05:47) (104/63 - 115/72)  RR: 18 (08-15-17 @ 05:47) (18 - 19)  SpO2: 96% (08-15-17 @ 05:47) (93% - 99%)  Wt(kg): --  ,   I&O's Summary    14 Aug 2017 07:01  -  15 Aug 2017 07:00  --------------------------------------------------------  IN: 860 mL / OUT: 1400 mL / NET: -540 mL    15 Aug 2017 07:01  -  15 Aug 2017 10:44  --------------------------------------------------------  IN: 410 mL / OUT: 0 mL / NET: 410 mL                   *****PHYSICAL EXAM:  GEN: A&O X 3 , NAD , comfortable  HEENT: NCAT, EOMI, MMM, no icterus  NECK: Supple, No JVD  CVS: S1S2 , regular , No M/R/G appreciated  PULM: CTA B/L,  no W/R/R appreciated  ABD.: soft. non tender, non distended,  bowel sounds present  Extrem: intact pulses , no edema noted  Derm: No rash or ecchymosis noted  PSYCH: normal mood, no depression, not anxious         *****LAB AND IMAGIN.2   9.90  )-----------( 150      ( 15 Aug 2017 07:41 )             33.6               08-15    128<L>  |  85<L>  |  23  ----------------------------<  116<H>  4.9   |  31  |  0.66    Ca    8.4      15 Aug 2017 07:23                         Urinalysis Basic - ( 15 Aug 2017 03:06 )    Color: Yellow / Appearance: x / S.011 / pH: x  Gluc: x / Ketone: Negative  / Bili: Negative / Urobili: Negative mg/dL   Blood: x / Protein: Negative mg/dL / Nitrite: Positive   Leuk Esterase: Large / RBC: 6 /HPF /  /HPF   Sq Epi: x / Non Sq Epi: 2 /HPF / Bacteria: Few            [All pertinent recent Imaging/Reports reviewed]    < from: TTE with Doppler (w/Cont) (17 @ 20:16) >  Conclusions:  1. Mild mitral annular calcification, otherwise normal  mitral valve. Minimal mitral regurgitation.  2. Calcified trileaflet aortic valve with normal opening.  Minimal aortic regurgitation.  3. Endocardial visualization enhanced with intravenous  injection of echo contrast (Definity). Grossly normal left  ventricular systolic function. Septal flattening consistent  with right ventricular overload.  4. Mild diastolic dysfunction (Stage I).  5. The right ventricle is not well visualized on axis;  right ventrilce appears mildly enlarged with decreased  systolic function.  6. Estimated pulmonary artery systolic pressure equals 80  mm Hg, assuming right atrial pressure equals 8 mm Hg,  consistent with severe pulmonary pressures.  *** Compared with echocardiogram of 2015, estimated PA  systolic pressure is significantly higher on today's study.  Findings are otherwise similar.    < end of copied text >         *****A S S E S S M E N T   A N D   P L A N :  84F with acute on chronic diastolic CHF, non compliant with lasix, low NA, low platelets  hold asa, heparin  R sided chf due to pulmonary htn   BP stable  continue BB with parameters  echo normal LV  renal f/u re Na      __________________________  A. CARMELO Cabral
- Patient seen and examined.  - In summary, patient is a 84y year old woman who presented with low Na, chf. (10 Aug 2017 12:42)  - Today, patient is without complaints.         *****MEDICATIONS:    MEDICATIONS  (STANDING):  sulfaSALAzine 500 milliGRAM(s) Oral two times a day  latanoprost 0.005% Ophthalmic Solution 1 Drop(s) Both EYES at bedtime  buDESOnide   0.5 milliGRAM(s) Respule 0.5 milliGRAM(s) Inhalation two times a day  simvastatin 20 milliGRAM(s) Oral at bedtime  metoprolol 25 milliGRAM(s) Oral two times a day  ferrous    sulfate 325 milliGRAM(s) Oral daily  brimonidine 0.2% Ophthalmic Solution 1 Drop(s) Both EYES two times a day  dorzolamide 2%/timolol 0.5% Ophthalmic Solution 1 Drop(s) Both EYES two times a day  levothyroxine 50 MICROGram(s) Oral daily  folic acid 1 milliGRAM(s) Oral daily  heparin  Injectable 5000 Unit(s) SubCutaneous every 12 hours  oxymorphone ER 10 milliGRAM(s) Oral every 12 hours  furosemide    Tablet 20 milliGRAM(s) Oral daily  cefTRIAXone   IVPB 1 Gram(s) IV Intermittent every 24 hours  cefTRIAXone   IVPB   IV Intermittent     MEDICATIONS  (PRN):  ALPRAZolam 0.125 milliGRAM(s) Oral every 8 hours PRN anxiety  ALBUTerol/ipratropium for Nebulization. 3 milliLiter(s) Nebulizer once PRN Bronchospasm               ***** REVIEW OF SYSTEM:  GEN: no fever, no chills, no pain  RESP: no SOB, no cough, no sputum  CVS: no chest pain, no palpitations, no edema  GI: no abdominal pain, no nausea, no vomiting, no constipation, no diarrhea  : no dysurea, no frequency  NEURO: no headache, no diziness  PSYCH: no depression, not anxious  Derm : no itching, no rash         ***** VITAL SIGNS:    T(F): 98.1 (17 @ 04:03), Max: 98.1 (17 @ 04:03)  HR: 84 (17 @ 04:03) (75 - 84)  BP: 141/81 (17 @ 04:03) (109/64 - 148/70)  RR: 18 (17 @ 04:03) (18 - 19)  SpO2: 95% (17 @ 04:03) (95% - 99%)  Wt(kg): --  ,   I&O's Summary    15 Aug 2017 07:  -  16 Aug 2017 07:00  --------------------------------------------------------  IN: 950 mL / OUT: 1200 mL / NET: -250 mL    16 Aug 2017 07:  -  16 Aug 2017 11:15  --------------------------------------------------------  IN: 50 mL / OUT: 0 mL / NET: 50 mL               *****PHYSICAL EXAM:  GEN: A&O X 3 , NAD , comfortable  HEENT: NCAT, EOMI, MMM, no icterus  NECK: Supple, No JVD  CVS: S1S2 , regular , No M/R/G appreciated  PULM: CTA B/L,  no W/R/R appreciated  ABD.: soft. non tender, non distended,  bowel sounds present  Extrem: intact pulses , no edema noted  Derm: No rash or ecchymosis noted  PSYCH: normal mood, no depression, not anxious         *****LAB AND IMAGIN.5   7.92  )-----------( 134      ( 16 Aug 2017 07:21 )             34.7               08-15    128<L>  |  85<L>  |  23  ----------------------------<  116<H>  4.9   |  31  |  0.66    Ca    8.4      15 Aug 2017 07:23                         Urinalysis Basic - ( 15 Aug 2017 03:06 )    Color: Yellow / Appearance: x / S.011 / pH: x  Gluc: x / Ketone: Negative  / Bili: Negative / Urobili: Negative mg/dL   Blood: x / Protein: Negative mg/dL / Nitrite: Positive   Leuk Esterase: Large / RBC: 6 /HPF /  /HPF   Sq Epi: x / Non Sq Epi: 2 /HPF / Bacteria: Few            [All pertinent recent Imaging/Reports reviewed]    < from: TTE with Doppler (w/Cont) (17 @ 20:16) >  Conclusions:  1. Mild mitral annular calcification, otherwise normal  mitral valve. Minimal mitral regurgitation.  2. Calcified trileaflet aortic valve with normal opening.  Minimal aortic regurgitation.  3. Endocardial visualization enhanced with intravenous  injection of echo contrast (Definity). Grossly normal left  ventricular systolic function. Septal flattening consistent  with right ventricular overload.  4. Mild diastolic dysfunction (Stage I).  5. The right ventricle is not well visualized on axis;  right ventrilce appears mildly enlarged with decreased  systolic function.  6. Estimated pulmonary artery systolic pressure equals 80  mm Hg, assuming right atrial pressure equals 8 mm Hg,  consistent with severe pulmonary pressures.  *** Compared with echocardiogram of 2015, estimated PA  systolic pressure is significantly higher on today's study.  Findings are otherwise similar.    < end of copied text >         *****A S S E S S M E N T   A N D   P L A N :  84F with acute on chronic diastolic CHF, non compliant with lasix, low NA, low platelets  hold asa, heparin  R sided chf due to pulmonary htn   BP stable  continue BB with parameters  echo normal LV  renal f/u   f/u labs    __________________________  YANY Cabral D.O.
- Patient seen and examined.  - In summary, patient is a 84y year old woman who presented with low Na, chf. (10 Aug 2017 12:42)  - Today, patient is without complaints.         *****MEDICATIONS:    MEDICATIONS  (STANDING):  sulfaSALAzine 500 milliGRAM(s) Oral two times a day  latanoprost 0.005% Ophthalmic Solution 1 Drop(s) Both EYES at bedtime  buDESOnide   0.5 milliGRAM(s) Respule 0.5 milliGRAM(s) Inhalation two times a day  simvastatin 20 milliGRAM(s) Oral at bedtime  metoprolol 25 milliGRAM(s) Oral two times a day  ferrous    sulfate 325 milliGRAM(s) Oral daily  brimonidine 0.2% Ophthalmic Solution 1 Drop(s) Both EYES two times a day  dorzolamide 2%/timolol 0.5% Ophthalmic Solution 1 Drop(s) Both EYES two times a day  levothyroxine 50 MICROGram(s) Oral daily  folic acid 1 milliGRAM(s) Oral daily  heparin  Injectable 5000 Unit(s) SubCutaneous every 12 hours  oxymorphone ER 10 milliGRAM(s) Oral every 12 hours  furosemide    Tablet 20 milliGRAM(s) Oral daily  cefTRIAXone   IVPB 1 Gram(s) IV Intermittent every 24 hours  cefTRIAXone   IVPB   IV Intermittent     MEDICATIONS  (PRN):  ALPRAZolam 0.125 milliGRAM(s) Oral every 8 hours PRN anxiety  ALBUTerol/ipratropium for Nebulization. 3 milliLiter(s) Nebulizer once PRN Bronchospasm             ***** REVIEW OF SYSTEM:  GEN: no fever, no chills, no pain  RESP: no SOB, no cough, no sputum  CVS: no chest pain, no palpitations, no edema  GI: no abdominal pain, no nausea, no vomiting, no constipation, no diarrhea  : no dysurea, no frequency  NEURO: no headache, no diziness  PSYCH: no depression, not anxious  Derm : no itching, no rash         ***** VITAL SIGNS:    T(F): 97.3 (17 @ 04:04), Max: 98.2 (17 @ 13:09)  HR: 76 (17 @ 04:04) (64 - 76)  BP: 134/76 (17 @ 04:04) (126/71 - 143/84)  RR: 17 (17 @ 04:04) (17 - )  SpO2: 98% (17 @ 04:04) (95% - 100%)  Wt(kg): --  ,   I&O's Summary    16 Aug 2017 07:01  -  17 Aug 2017 07:00  --------------------------------------------------------  IN: 150 mL / OUT: 200 mL / NET: -50 mL                   *****PHYSICAL EXAM:  GEN: A&O X 3 , NAD , comfortable  HEENT: NCAT, EOMI, MMM, no icterus  NECK: Supple, No JVD  CVS: S1S2 , regular , No M/R/G appreciated  PULM: CTA B/L,  no W/R/R appreciated  ABD.: soft. non tender, non distended,  bowel sounds present  Extrem: intact pulses , no edema noted  Derm: No rash or ecchymosis noted  PSYCH: normal mood, no depression, not anxious         *****LAB AND IMAGIN.5   7.92  )-----------( 134      ( 16 Aug 2017 07:21 )             34.7               08    127<L>  |  85<L>  |  18  ----------------------------<  97  4.7   |  31  |  0.76    Ca    8.5      16 Aug 2017 07:09        [All pertinent recent Imaging/Reports reviewed]    < from: TTE with Doppler (w/Cont) (17 @ 20:16) >  Conclusions:  1. Mild mitral annular calcification, otherwise normal  mitral valve. Minimal mitral regurgitation.  2. Calcified trileaflet aortic valve with normal opening.  Minimal aortic regurgitation.  3. Endocardial visualization enhanced with intravenous  injection of echo contrast (Definity). Grossly normal left  ventricular systolic function. Septal flattening consistent  with right ventricular overload.  4. Mild diastolic dysfunction (Stage I).  5. The right ventricle is not well visualized on axis;  right ventrilce appears mildly enlarged with decreased  systolic function.  6. Estimated pulmonary artery systolic pressure equals 80  mm Hg, assuming right atrial pressure equals 8 mm Hg,  consistent with severe pulmonary pressures.  *** Compared with echocardiogram of 2015, estimated PA  systolic pressure is significantly higher on today's study.  Findings are otherwise similar.    < end of copied text >         *****A S S E S S M E N T   A N D   P L A N :  84F with acute on chronic diastolic CHF, non compliant with lasix, low NA, low platelets  hold asa, heparin  R sided chf due to pulmonary htn   BP stable  continue BB with parameters  echo normal LV  renal f/u   f/u labs  DCP    __________________________  A. JULIANNE Cabral.
[facility]   [phone]  Dr. Rolon (covering for Dr. Navarrete)  RENAL FOLLOW UP NOTE  --------------------------------------------------------------------------------  Patient seen and examined. As per  aide at bedside, patient has been sleeping all morning with very little PO intake    PAST HISTORY  --------------------------------------------------------------------------------  No significant changes to PMH, PSH, FHx, SHx, unless otherwise noted    ALLERGIES & MEDICATIONS  --------------------------------------------------------------------------------  Allergies    No Known Allergies    Intolerances      Standing Inpatient Medications  sulfaSALAzine 500 milliGRAM(s) Oral two times a day  latanoprost 0.005% Ophthalmic Solution 1 Drop(s) Both EYES at bedtime  buDESOnide   0.5 milliGRAM(s) Respule 0.5 milliGRAM(s) Inhalation two times a day  diltiazem    milliGRAM(s) Oral daily  gabapentin 300 milliGRAM(s) Oral two times a day  simvastatin 20 milliGRAM(s) Oral at bedtime  metoprolol 25 milliGRAM(s) Oral two times a day  ferrous    sulfate 325 milliGRAM(s) Oral daily  brimonidine 0.2% Ophthalmic Solution 1 Drop(s) Both EYES two times a day  dorzolamide 2%/timolol 0.5% Ophthalmic Solution 1 Drop(s) Both EYES two times a day  levothyroxine 50 MICROGram(s) Oral daily  folic acid 1 milliGRAM(s) Oral daily  heparin  Injectable 5000 Unit(s) SubCutaneous every 12 hours  oxymorphone ER 10 milliGRAM(s) Oral every 12 hours  furosemide   Injectable 20 milliGRAM(s) IV Push once    PRN Inpatient Medications  ALPRAZolam 0.125 milliGRAM(s) Oral every 8 hours PRN  ALBUTerol/ipratropium for Nebulization. 3 milliLiter(s) Nebulizer once PRN      REVIEW OF SYSTEMS  --------------------------------------------------------------------------------  limited due to dementia.  Denies pain/SOB    VITALS/PHYSICAL EXAM  --------------------------------------------------------------------------------  T(C): 36.3 (08-12-17 @ 12:36), Max: 36.5 (08-12-17 @ 03:43)  HR: 89 (08-12-17 @ 12:36) (55 - 89)  BP: 119/89 (08-12-17 @ 12:36) (94/60 - 119/89)  RR: 18 (08-12-17 @ 12:36) (17 - 19)  SpO2: 95% (08-12-17 @ 12:36) (87% - 96%)  Wt(kg): --        08-11-17 @ 07:01  -  08-12-17 @ 07:00  --------------------------------------------------------  IN: 630 mL / OUT: 350 mL / NET: 280 mL    08-12-17 @ 07:01  -  08-12-17 @ 14:41  --------------------------------------------------------  IN: 240 mL / OUT: 0 mL / NET: 240 mL      Physical Exam:  	Gen: NAD, frail-appearing  	Pulm: Decreased breath sounds b/l with scant rhonchi  	CV: RRR, S1S2  	Abd: +BS, soft, nontender/nondistended  	: No suprapubic tenderness.  + quinones with scant, concentrated urine              Extremity: No cyanosis, + pedal edema  	Neuro: A & O to self only, somnolent        LABS/STUDIES  --------------------------------------------------------------------------------              11.8   6.41  >-----------<  106      [08-11-17 @ 07:12]              34.4     125  |  84  |  19  ----------------------------<  109      [08-12-17 @ 08:48]  5.0   |  32  |  1.22        Ca     9.5     [08-12-17 @ 08:48]    TPro  6.5  /  Alb  3.5  /  TBili  0.3  /  DBili  x   /  AST  23  /  ALT  15  /  AlkPhos  85  [08-12-17 @ 08:48]        Serum Osmolality 265      [08-10-17 @ 17:55]    eGFR if Non African American: 41 mL/min/1.73M2 (08-12 @ 08:48)  eGFR if : 47 mL/min/1.73M2 (08-12 @ 08:48)    Creatinine Trend:  SCr 1.22 [08-12 @ 08:48]  SCr 1.13 [08-11 @ 11:02]  SCr 1.06 [08-11 @ 07:12]  SCr 0.77 [08-10 @ 10:01]  SCr 0.73 [08-10 @ 06:34]                Urine Creatinine 45      [08-11-17 @ 08:02]  Urine Sodium 37      [08-11-17 @ 16:15]  Urine Potassium 21      [08-11-17 @ 16:15]  Urine Chloride 37      [08-11-17 @ 16:15]  Urine Osmolality 367      [08-11-17 @ 16:15]    HbA1c 5.7      [08-11-17 @ 07:12]  TSH 1.40      [08-10-17 @ 12:13]
[facility]   [phone]  Dr. Rolon (covering for Dr. Navarrete)  RENAL FOLLOW UP NOTE  --------------------------------------------------------------------------------  Patient seen and examined.  More alert today.  Denies SOB    PAST HISTORY  --------------------------------------------------------------------------------  No significant changes to PMH, PSH, FHx, SHx, unless otherwise noted    ALLERGIES & MEDICATIONS  --------------------------------------------------------------------------------  Allergies    No Known Allergies    Intolerances      Standing Inpatient Medications  sulfaSALAzine 500 milliGRAM(s) Oral two times a day  latanoprost 0.005% Ophthalmic Solution 1 Drop(s) Both EYES at bedtime  buDESOnide   0.5 milliGRAM(s) Respule 0.5 milliGRAM(s) Inhalation two times a day  gabapentin 300 milliGRAM(s) Oral two times a day  simvastatin 20 milliGRAM(s) Oral at bedtime  metoprolol 25 milliGRAM(s) Oral two times a day  ferrous    sulfate 325 milliGRAM(s) Oral daily  brimonidine 0.2% Ophthalmic Solution 1 Drop(s) Both EYES two times a day  dorzolamide 2%/timolol 0.5% Ophthalmic Solution 1 Drop(s) Both EYES two times a day  levothyroxine 50 MICROGram(s) Oral daily  folic acid 1 milliGRAM(s) Oral daily  heparin  Injectable 5000 Unit(s) SubCutaneous every 12 hours  oxymorphone ER 10 milliGRAM(s) Oral every 12 hours  tolvaptan 15 milliGRAM(s) Oral once    PRN Inpatient Medications  ALPRAZolam 0.125 milliGRAM(s) Oral every 8 hours PRN  ALBUTerol/ipratropium for Nebulization. 3 milliLiter(s) Nebulizer once PRN      REVIEW OF SYSTEMS  --------------------------------------------------------------------------------  Gen: + fatigue, denies fevers/chills  CVS: denies chest pain/palpitations  Resp: denies SOB/Cough  GI: Denies N/V/Abd pain  : Denies dysuria/oliguria/hematuria    All other systems were reviewed and are negative, except as noted.    VITALS/PHYSICAL EXAM  --------------------------------------------------------------------------------  T(C): 37 (08-13-17 @ 03:49), Max: 37 (08-13-17 @ 03:49)  HR: 64 (08-13-17 @ 07:51) (54 - 89)  BP: 116/76 (08-13-17 @ 07:51) (94/61 - 133/79)  RR: 18 (08-13-17 @ 03:49) (17 - 18)  SpO2: 96% (08-13-17 @ 03:49) (93% - 96%)  Wt(kg): --        08-12-17 @ 07:01  -  08-13-17 @ 07:00  --------------------------------------------------------  IN: 770 mL / OUT: 250 mL / NET: 520 mL      Physical Exam:  	Gen: NAD, frail-appearing  	Pulm: B/L scattered faint rhonchi  	CV: RRR, S1S2  	Abd: +BS, soft, nontender/nondistended  	: No suprapubic tenderness.  + quinones              Extremity: No cyanosis, no edema  	Neuro: A&O to self, place      LABS/STUDIES  --------------------------------------------------------------------------------    122  |  82  |  24  ----------------------------<  126      [08-13-17 @ 05:47]  4.9   |  30  |  1.10        Ca     8.9     [08-13-17 @ 05:47]    TPro  6.5  /  Alb  3.5  /  TBili  0.3  /  DBili  x   /  AST  23  /  ALT  15  /  AlkPhos  85  [08-12-17 @ 08:48]          eGFR if Non African American: 46 mL/min/1.73M2 (08-13 @ 05:47)  eGFR if : 53 mL/min/1.73M2 (08-13 @ 05:47)    Creatinine Trend:  SCr 1.10 [08-13 @ 05:47]  SCr 1.22 [08-12 @ 08:48]  SCr 1.13 [08-11 @ 11:02]  SCr 1.06 [08-11 @ 07:12]  SCr 0.77 [08-10 @ 10:01]                Urine Creatinine 45      [08-11-17 @ 08:02]  Urine Sodium 37      [08-11-17 @ 16:15]  Urine Potassium 21      [08-11-17 @ 16:15]  Urine Chloride 37      [08-11-17 @ 16:15]  Urine Osmolality 367      [08-11-17 @ 16:15]    HbA1c 5.7      [08-11-17 @ 07:12]  TSH 1.40      [08-10-17 @ 12:13]
[facility]   [phone]  RENAL FOLLOW UP NOTE  --------------------------------------------------------------------------------  Chief Complaint:    24 hour events/subjective:    answers questions states no N/V today    PAST HISTORY  --------------------------------------------------------------------------------  No significant changes to PMH, PSH, FHx, SHx, unless otherwise noted    ALLERGIES & MEDICATIONS  --------------------------------------------------------------------------------  Allergies    No Known Allergies    Intolerances      Standing Inpatient Medications  sulfaSALAzine 500 milliGRAM(s) Oral two times a day  latanoprost 0.005% Ophthalmic Solution 1 Drop(s) Both EYES at bedtime  buDESOnide   0.5 milliGRAM(s) Respule 0.5 milliGRAM(s) Inhalation two times a day  simvastatin 20 milliGRAM(s) Oral at bedtime  metoprolol 25 milliGRAM(s) Oral two times a day  ferrous    sulfate 325 milliGRAM(s) Oral daily  brimonidine 0.2% Ophthalmic Solution 1 Drop(s) Both EYES two times a day  dorzolamide 2%/timolol 0.5% Ophthalmic Solution 1 Drop(s) Both EYES two times a day  levothyroxine 50 MICROGram(s) Oral daily  folic acid 1 milliGRAM(s) Oral daily  heparin  Injectable 5000 Unit(s) SubCutaneous every 12 hours  oxymorphone ER 10 milliGRAM(s) Oral every 12 hours  furosemide    Tablet 20 milliGRAM(s) Oral daily  cefTRIAXone   IVPB 1 Gram(s) IV Intermittent every 24 hours  cefTRIAXone   IVPB   IV Intermittent     PRN Inpatient Medications  ALPRAZolam 0.125 milliGRAM(s) Oral every 8 hours PRN  ALBUTerol/ipratropium for Nebulization. 3 milliLiter(s) Nebulizer once PRN      REVIEW OF SYSTEMS  --------------------------------------------------------------------------------    Gen: denies fatigue, fevers/chills,  CVS: denies chest pain/palpitations  Resp: denies SOB/Cough  GI: Denies N/V/Abd pain  : Denies dysuria/oliguria/hematuria    All other systems were reviewed and are negative, except as noted.    VITALS/PHYSICAL EXAM  --------------------------------------------------------------------------------  T(C): 36.8 (08-16-17 @ 13:09), Max: 36.8 (08-16-17 @ 13:09)  HR: 71 (08-16-17 @ 18:32) (64 - 84)  BP: 143/84 (08-16-17 @ 18:32) (128/79 - 148/70)  RR: 17 (08-16-17 @ 13:09) (17 - 19)  SpO2: 95% (08-16-17 @ 13:09) (95% - 99%)  Wt(kg): --        08-15-17 @ 07:01  -  08-16-17 @ 07:00  --------------------------------------------------------  IN: 950 mL / OUT: 1200 mL / NET: -250 mL    08-16-17 @ 07:01  -  08-16-17 @ 19:31  --------------------------------------------------------  IN: 150 mL / OUT: 200 mL / NET: -50 mL      Physical Exam:  	    Physical Exam:  	Gen: alert oriented place person and date   	Pulm: Decreased breath sounds b/l bases. no rales or ronchi or wheezing  	CV: RRR, S1/S2. no rub  	Back: No CVA tenderness; no sacral edema  	Abd: +BS, soft, nontender/nondistended  	: No suprapubic tenderness.               Extremity: No cyanosis, no edema no clubbing  	LABS/STUDIES  --------------------------------------------------------------------------------              11.5   7.92  >-----------<  134      [08-16-17 @ 07:21]              34.7     127  |  85  |  18  ----------------------------<  97      [08-16-17 @ 07:09]  4.7   |  31  |  0.76        Ca     8.5     [08-16-17 @ 07:09]            Creatinine Trend:  SCr 0.76 [08-16 @ 07:09]  SCr 0.66 [08-15 @ 07:23]  SCr 0.92 [08-14 @ 10:52]  SCr 0.84 [08-14 @ 07:11]  SCr 1.10 [08-13 @ 05:47]              Urinalysis - [08-15-17 @ 03:06]      Color Yellow / Appearance  / SG 1.011 / pH 6.5      Gluc Negative / Ketone Negative  / Bili Negative / Urobili Negative       Blood Trace / Protein Negative / Leuk Est Large / Nitrite Positive      RBC 6 /  / Hyaline 5 / Gran  / Sq Epi  / Non Sq Epi 2 / Bacteria Few    Urine Creatinine 45      [08-11-17 @ 08:02]  Urine Sodium 37      [08-11-17 @ 16:15]  Urine Potassium 21      [08-11-17 @ 16:15]  Urine Chloride 37      [08-11-17 @ 16:15]  Urine Osmolality 367      [08-11-17 @ 16:15]    HbA1c 5.7      [08-11-17 @ 07:12]  TSH 1.40      [08-10-17 @ 12:13]
[facility]   [phone]  RENAL FOLLOW UP NOTE  --------------------------------------------------------------------------------  Chief Complaint:    24 hour events/subjective:    c/o greenish sputum for months however, since in hosp has dissipated    PAST HISTORY  --------------------------------------------------------------------------------  No significant changes to PMH, PSH, FHx, SHx, unless otherwise noted    ALLERGIES & MEDICATIONS  --------------------------------------------------------------------------------  Allergies    No Known Allergies    Intolerances      Standing Inpatient Medications  sulfaSALAzine 500 milliGRAM(s) Oral two times a day  latanoprost 0.005% Ophthalmic Solution 1 Drop(s) Both EYES at bedtime  buDESOnide   0.5 milliGRAM(s) Respule 0.5 milliGRAM(s) Inhalation two times a day  diltiazem    milliGRAM(s) Oral daily  gabapentin 300 milliGRAM(s) Oral two times a day  simvastatin 20 milliGRAM(s) Oral at bedtime  metoprolol 25 milliGRAM(s) Oral two times a day  ferrous    sulfate 325 milliGRAM(s) Oral daily  brimonidine 0.2% Ophthalmic Solution 1 Drop(s) Both EYES two times a day  dorzolamide 2%/timolol 0.5% Ophthalmic Solution 1 Drop(s) Both EYES two times a day  levothyroxine 50 MICROGram(s) Oral daily  folic acid 1 milliGRAM(s) Oral daily  heparin  Injectable 5000 Unit(s) SubCutaneous every 12 hours  oxymorphone ER 10 milliGRAM(s) Oral every 12 hours  sodium chloride 1 Gram(s) Oral every 4 hours    PRN Inpatient Medications  ALPRAZolam 0.125 milliGRAM(s) Oral every 8 hours PRN  ALBUTerol/ipratropium for Nebulization. 3 milliLiter(s) Nebulizer once PRN      REVIEW OF SYSTEMS  --------------------------------------------------------------------------------    Gen: + bedbound + fatigue,-fevers/chills,  CVS: denies chest pain/palpitations  Resp: denies SOB worsening. + cough   GI: Denies N/V/Abd pain  : Denies dysuria/hematuria    All other systems were reviewed and are negative, except as noted.    VITALS/PHYSICAL EXAM  --------------------------------------------------------------------------------  T(C): 36.3 (08-11-17 @ 04:20), Max: 36.5 (08-10-17 @ 20:27)  HR: 62 (08-11-17 @ 04:20) (59 - 67)  BP: 107/67 (08-11-17 @ 04:20) (104/56 - 151/74)  RR: 18 (08-11-17 @ 04:20) (18 - 18)  SpO2: 92% (08-11-17 @ 04:20) (92% - 97%)  Wt(kg): --    Weight (kg): 79.2 (08-10-17 @ 10:49)      08-10-17 @ 07:01  -  08-11-17 @ 07:00  --------------------------------------------------------  IN: 630 mL / OUT: 1100 mL / NET: -470 mL      Physical Exam:  	    Physical Exam:  	Gen: alert oriented x 2 at present   	Pulm: Decreased breath sounds b/l bases. no rales + coarse ronchi  lower lung fields   	CV: RRR, S1/S2. no rub  	Back: No CVA tenderness; no sacral edema  	Abd: +BS, soft, nontender/nondistended  	: No suprapubic tenderness.               Extremity: No cyanosis, no edema no clubbing  	LABS/STUDIES  --------------------------------------------------------------------------------              11.8   6.41  >-----------<  106      [08-11-17 @ 07:12]              34.4     125  |  83  |  13  ----------------------------<  97      [08-11-17 @ 07:12]  4.5   |  32  |  1.06        Ca     8.8     [08-11-17 @ 07:12]    TPro  6.3  /  Alb  3.1  /  TBili  0.4  /  DBili  x   /  AST  24  /  ALT  18  /  AlkPhos  75  [08-10-17 @ 10:01]    PT/INR: PT 12.3 , INR 1.13       [08-09-17 @ 16:28]  PTT: 39.3       [08-09-17 @ 16:28]    Uric acid 5.4      [08-10-17 @ 10:01]  Troponin <0.01      [08-09-17 @ 16:30]  Serum Osmolality 265      [08-10-17 @ 17:55]    Creatinine Trend:  SCr 1.06 [08-11 @ 07:12]  SCr 0.77 [08-10 @ 10:01]  SCr 0.73 [08-10 @ 06:34]  SCr 0.70 [08-09 @ 16:30]                Urine Creatinine 45      [08-11-17 @ 08:02]  Urine Sodium 82      [08-11-17 @ 08:02]  Urine Potassium 18      [08-11-17 @ 08:02]  Urine Chloride 94      [08-11-17 @ 08:02]    HbA1c 4.8      [07-14-15 @ 08:17]  TSH 1.40      [08-10-17 @ 12:13]
[facility]   [phone]  RENAL FOLLOW UP NOTE  --------------------------------------------------------------------------------  Chief Complaint:    24 hour events/subjective:    awake and responsive had vomiting episode     PAST HISTORY  --------------------------------------------------------------------------------  No significant changes to PMH, PSH, FHx, SHx, unless otherwise noted    ALLERGIES & MEDICATIONS  --------------------------------------------------------------------------------  Allergies    No Known Allergies    Intolerances      Standing Inpatient Medications  sulfaSALAzine 500 milliGRAM(s) Oral two times a day  latanoprost 0.005% Ophthalmic Solution 1 Drop(s) Both EYES at bedtime  buDESOnide   0.5 milliGRAM(s) Respule 0.5 milliGRAM(s) Inhalation two times a day  simvastatin 20 milliGRAM(s) Oral at bedtime  metoprolol 25 milliGRAM(s) Oral two times a day  ferrous    sulfate 325 milliGRAM(s) Oral daily  brimonidine 0.2% Ophthalmic Solution 1 Drop(s) Both EYES two times a day  dorzolamide 2%/timolol 0.5% Ophthalmic Solution 1 Drop(s) Both EYES two times a day  levothyroxine 50 MICROGram(s) Oral daily  folic acid 1 milliGRAM(s) Oral daily  heparin  Injectable 5000 Unit(s) SubCutaneous every 12 hours  oxymorphone ER 10 milliGRAM(s) Oral every 12 hours  furosemide    Tablet 20 milliGRAM(s) Oral daily  cefTRIAXone   IVPB   IV Intermittent     PRN Inpatient Medications  ALPRAZolam 0.125 milliGRAM(s) Oral every 8 hours PRN  ALBUTerol/ipratropium for Nebulization. 3 milliLiter(s) Nebulizer once PRN      REVIEW OF SYSTEMS  --------------------------------------------------------------------------------    Gen: denies fatigue, fevers/chills,  CVS: denies chest pain/palpitations  Resp: denies SOB/Cough  GI: Denies Abd pain. + vomiting   : Denies dysuria/oliguria/hematuria    All other systems were reviewed and are negative, except as noted.    VITALS/PHYSICAL EXAM  --------------------------------------------------------------------------------  T(C): 36.4 (08-15-17 @ 11:18), Max: 37.2 (08-15-17 @ 05:47)  HR: 75 (08-15-17 @ 11:18) (68 - 81)  BP: 109/64 (08-15-17 @ 11:18) (109/64 - 115/72)  RR: 18 (08-15-17 @ 11:18) (18 - 19)  SpO2: 98% (08-15-17 @ 11:18) (93% - 98%)  Wt(kg): --        08-14-17 @ 07:01  -  08-15-17 @ 07:00  --------------------------------------------------------  IN: 860 mL / OUT: 1400 mL / NET: -540 mL    08-15-17 @ 07:01  -  08-15-17 @ 19:27  --------------------------------------------------------  IN: 710 mL / OUT: 600 mL / NET: 110 mL      Physical Exam:  	    Physical Exam:  	Gen: alert oriented place person and date   	Pulm: Decreased breath sounds b/l bases. no rales or ronchi or wheezing  	CV: RRR, S1/S2. no rub  	Back: No CVA tenderness; no sacral edema  	Abd: +BS, soft, nontender/nondistended  	: No suprapubic tenderness.               Extremity: No cyanosis, no edema no clubbing  	  LABS/STUDIES  --------------------------------------------------------------------------------              11.2   9.90  >-----------<  150      [08-15-17 @ 07:41]              33.6     128  |  85  |  23  ----------------------------<  116      [08-15-17 @ 07:23]  4.9   |  31  |  0.66        Ca     8.4     [08-15-17 @ 07:23]            Creatinine Trend:  SCr 0.66 [08-15 @ 07:23]  SCr 0.92 [08-14 @ 10:52]  SCr 0.84 [08-14 @ 07:11]  SCr 1.10 [08-13 @ 05:47]  SCr 1.22 [08-12 @ 08:48]              Urinalysis - [08-15-17 @ 03:06]      Color Yellow / Appearance  / SG 1.011 / pH 6.5      Gluc Negative / Ketone Negative  / Bili Negative / Urobili Negative       Blood Trace / Protein Negative / Leuk Est Large / Nitrite Positive      RBC 6 /  / Hyaline 5 / Gran  / Sq Epi  / Non Sq Epi 2 / Bacteria Few    Urine Creatinine 45      [08-11-17 @ 08:02]  Urine Sodium 37      [08-11-17 @ 16:15]  Urine Potassium 21      [08-11-17 @ 16:15]  Urine Chloride 37      [08-11-17 @ 16:15]  Urine Osmolality 367      [08-11-17 @ 16:15]    HbA1c 5.7      [08-11-17 @ 07:12]  TSH 1.40      [08-10-17 @ 12:13]
[facility]   [phone]  RENAL FOLLOW UP NOTE  --------------------------------------------------------------------------------  Chief Complaint:    24 hour events/subjective:    difficult to arouse sleeping     PAST HISTORY  --------------------------------------------------------------------------------  No significant changes to PMH, PSH, FHx, SHx, unless otherwise noted    ALLERGIES & MEDICATIONS  --------------------------------------------------------------------------------  Allergies    No Known Allergies    Intolerances      Standing Inpatient Medications  sulfaSALAzine 500 milliGRAM(s) Oral two times a day  latanoprost 0.005% Ophthalmic Solution 1 Drop(s) Both EYES at bedtime  buDESOnide   0.5 milliGRAM(s) Respule 0.5 milliGRAM(s) Inhalation two times a day  gabapentin 300 milliGRAM(s) Oral two times a day  simvastatin 20 milliGRAM(s) Oral at bedtime  metoprolol 25 milliGRAM(s) Oral two times a day  ferrous    sulfate 325 milliGRAM(s) Oral daily  brimonidine 0.2% Ophthalmic Solution 1 Drop(s) Both EYES two times a day  dorzolamide 2%/timolol 0.5% Ophthalmic Solution 1 Drop(s) Both EYES two times a day  levothyroxine 50 MICROGram(s) Oral daily  folic acid 1 milliGRAM(s) Oral daily  heparin  Injectable 5000 Unit(s) SubCutaneous every 12 hours  oxymorphone ER 10 milliGRAM(s) Oral every 12 hours    PRN Inpatient Medications  ALPRAZolam 0.125 milliGRAM(s) Oral every 8 hours PRN  ALBUTerol/ipratropium for Nebulization. 3 milliLiter(s) Nebulizer once PRN      REVIEW OF SYSTEMS  --------------------------------------------------------------------------------  unable to give ROS        VITALS/PHYSICAL EXAM  --------------------------------------------------------------------------------  T(C): 37.1 (08-14-17 @ 09:52), Max: 37.2 (08-13-17 @ 21:02)  HR: 59 (08-14-17 @ 09:52) (59 - 81)  BP: 114/71 (08-14-17 @ 09:52) (109/73 - 121/74)  RR: 18 (08-14-17 @ 09:52) (18 - 19)  SpO2: 99% (08-14-17 @ 09:52) (94% - 99%)  Wt(kg): --        08-13-17 @ 07:01  -  08-14-17 @ 07:00  --------------------------------------------------------  IN: 120 mL / OUT: 880 mL / NET: -760 mL    08-14-17 @ 07:01  -  08-14-17 @ 12:25  --------------------------------------------------------  IN: 280 mL / OUT: 0 mL / NET: 280 mL      Physical Exam:  	    Physical Exam:  	Gen:difficult to arouse finally awoken and somewhat responsive  	Pulm: Decreased breath sounds b/l bases. no rales or ronchi or wheezing  	CV: RRR, S1/S2. no rub  	Back: No CVA tenderness; no sacral edema  	Abd: +BS, soft, nontender/nondistended  	: No suprapubic tenderness.               Extremity: No cyanosis, no edema no clubbing  	LABS/STUDIES  --------------------------------------------------------------------------------              12.3   13.8  >-----------<  124      [08-14-17 @ 10:52]              36.7     124  |  84  |  27  ----------------------------<  125      [08-14-17 @ 10:52]  5.3   |  31  |  0.92        Ca     8.8     [08-14-17 @ 10:52]      k 5.4      Creatinine Trend:  SCr 0.92 [08-14 @ 10:52]  SCr 0.84 [08-14 @ 07:11]  SCr 1.10 [08-13 @ 05:47]  SCr 1.22 [08-12 @ 08:48]  SCr 1.13 [08-11 @ 11:02]                Urine Creatinine 45      [08-11-17 @ 08:02]  Urine Sodium 37      [08-11-17 @ 16:15]  Urine Potassium 21      [08-11-17 @ 16:15]  Urine Chloride 37      [08-11-17 @ 16:15]  Urine Osmolality 367      [08-11-17 @ 16:15]    HbA1c 5.7      [08-11-17 @ 07:12]  TSH 1.40      [08-10-17 @ 12:13]
SUBJECTIVE / OVERNIGHT EVENTS: No nausea, vomiting or diarrhea, no fever or chills, no dizziness or chest pain, no dysuria or hematuria .      CAPILLARY BLOOD GLUCOSE        I&O's Summary    11 Aug 2017 07:01  -  12 Aug 2017 07:00  --------------------------------------------------------  IN: 630 mL / OUT: 350 mL / NET: 280 mL        PHYSICAL EXAM:  HEAD:  Atraumatic, Normocephalic  EYES: EOMI, PERRLA, conjunctiva and sclera clear  NECK: Supple, No JVD  CHEST/LUNG: Clear to auscultation bilaterally; No wheeze  HEART: Regular rate and rhythm; No murmurs, rubs, or gallops  ABDOMEN: Soft, Nontender, Nondistended; Bowel sounds present  EXTREMITIES:  2+ Peripheral Pulses, No clubbing, cyanosis, or edema  PSYCH: AAOx3  NEUROLOGY: non-focal  SKIN: No rashes or lesions    MEDICATIONS  (STANDING):  sulfaSALAzine 500 milliGRAM(s) Oral two times a day  latanoprost 0.005% Ophthalmic Solution 1 Drop(s) Both EYES at bedtime  buDESOnide   0.5 milliGRAM(s) Respule 0.5 milliGRAM(s) Inhalation two times a day  diltiazem    milliGRAM(s) Oral daily  gabapentin 300 milliGRAM(s) Oral two times a day  simvastatin 20 milliGRAM(s) Oral at bedtime  metoprolol 25 milliGRAM(s) Oral two times a day  ferrous    sulfate 325 milliGRAM(s) Oral daily  brimonidine 0.2% Ophthalmic Solution 1 Drop(s) Both EYES two times a day  dorzolamide 2%/timolol 0.5% Ophthalmic Solution 1 Drop(s) Both EYES two times a day  levothyroxine 50 MICROGram(s) Oral daily  folic acid 1 milliGRAM(s) Oral daily  heparin  Injectable 5000 Unit(s) SubCutaneous every 12 hours  oxymorphone ER 10 milliGRAM(s) Oral every 12 hours    MEDICATIONS  (PRN):  ALPRAZolam 0.125 milliGRAM(s) Oral every 8 hours PRN anxiety  ALBUTerol/ipratropium for Nebulization. 3 milliLiter(s) Nebulizer once PRN Bronchospasm      LABS:                        11.8   6.41  )-----------( 106      ( 11 Aug 2017 07:12 )             34.4     08-11    124<L>  |  81<L>  |  15  ----------------------------<  142<H>  4.5   |  35<H>  |  1.13    Ca    9.3      11 Aug 2017 11:02    TPro  6.3  /  Alb  3.1<L>  /  TBili  0.4  /  DBili  x   /  AST  24  /  ALT  18  /  AlkPhos  75  08-10                  Hemoglobin A1C, Whole Blood: 5.7 % (08-11 @ 07:12)    Thyroid Stimulating Hormone, Serum: 1.40 uIU/mL (08-10 @ 12:13)      Cultures:    EKG:    Radiological Studies:    Consultant(s) Notes Reviewed:      Care Discussed with Consultants/Other Providers:
SUBJECTIVE / OVERNIGHT EVENTS: No nausea, vomiting or diarrhea, no fever or chills, no dizziness or chest pain, no dysuria or hematuria .      CAPILLARY BLOOD GLUCOSE        I&O's Summary    12 Aug 2017 07:01  -  13 Aug 2017 07:00  --------------------------------------------------------  IN: 770 mL / OUT: 250 mL / NET: 520 mL        PHYSICAL EXAM:  HEAD:  Atraumatic, Normocephalic  EYES: EOMI, PERRLA, conjunctiva and sclera clear  NECK: Supple, No JVD  CHEST/LUNG: Clear to auscultation bilaterally; No wheeze  HEART: Regular rate and rhythm; No murmurs, rubs, or gallops  ABDOMEN: Soft, Nontender, Nondistended; Bowel sounds present  EXTREMITIES:  2+ Peripheral Pulses, No clubbing, cyanosis, or edema  PSYCH: AAOx3  NEUROLOGY: non-focal  SKIN: No rashes or lesions    MEDICATIONS  (STANDING):  sulfaSALAzine 500 milliGRAM(s) Oral two times a day  latanoprost 0.005% Ophthalmic Solution 1 Drop(s) Both EYES at bedtime  buDESOnide   0.5 milliGRAM(s) Respule 0.5 milliGRAM(s) Inhalation two times a day  diltiazem    milliGRAM(s) Oral daily  gabapentin 300 milliGRAM(s) Oral two times a day  simvastatin 20 milliGRAM(s) Oral at bedtime  metoprolol 25 milliGRAM(s) Oral two times a day  ferrous    sulfate 325 milliGRAM(s) Oral daily  brimonidine 0.2% Ophthalmic Solution 1 Drop(s) Both EYES two times a day  dorzolamide 2%/timolol 0.5% Ophthalmic Solution 1 Drop(s) Both EYES two times a day  levothyroxine 50 MICROGram(s) Oral daily  folic acid 1 milliGRAM(s) Oral daily  heparin  Injectable 5000 Unit(s) SubCutaneous every 12 hours  oxymorphone ER 10 milliGRAM(s) Oral every 12 hours    MEDICATIONS  (PRN):  ALPRAZolam 0.125 milliGRAM(s) Oral every 8 hours PRN anxiety  ALBUTerol/ipratropium for Nebulization. 3 milliLiter(s) Nebulizer once PRN Bronchospasm      LABS:    08-13    122<L>  |  82<L>  |  24<H>  ----------------------------<  126<H>  4.9   |  30  |  1.10    Ca    8.9      13 Aug 2017 05:47    TPro  6.5  /  Alb  3.5  /  TBili  0.3  /  DBili  x   /  AST  23  /  ALT  15  /  AlkPhos  85  08-12                  Hemoglobin A1C, Whole Blood: 5.7 % (08-11 @ 07:12)    Thyroid Stimulating Hormone, Serum: 1.40 uIU/mL (08-10 @ 12:13)      Cultures:    EKG:    Radiological Studies:    Consultant(s) Notes Reviewed:      Care Discussed with Consultants/Other Providers:
SUBJECTIVE / OVERNIGHT EVENTS: No nausea, vomiting or diarrhea, no fever or chills, no dizziness or chest pain, no dysuria or hematuria .      CAPILLARY BLOOD GLUCOSE        I&O's Summary    13 Aug 2017 07:01  -  14 Aug 2017 07:00  --------------------------------------------------------  IN: 120 mL / OUT: 880 mL / NET: -760 mL        PHYSICAL EXAM:  HEAD:  Atraumatic, Normocephalic  EYES: EOMI, PERRLA, conjunctiva and sclera clear  NECK: Supple, No JVD  CHEST/LUNG: Clear to auscultation bilaterally; No wheeze  HEART: Regular rate and rhythm; No murmurs, rubs, or gallops  ABDOMEN: Soft, Nontender, Nondistended; Bowel sounds present  EXTREMITIES:  2+ Peripheral Pulses, No clubbing, cyanosis, or fito  NEUROLOGY: non-focal  arouasable  SKIN: No rashes or lesions    MEDICATIONS  (STANDING):  sulfaSALAzine 500 milliGRAM(s) Oral two times a day  latanoprost 0.005% Ophthalmic Solution 1 Drop(s) Both EYES at bedtime  buDESOnide   0.5 milliGRAM(s) Respule 0.5 milliGRAM(s) Inhalation two times a day  gabapentin 300 milliGRAM(s) Oral two times a day  simvastatin 20 milliGRAM(s) Oral at bedtime  metoprolol 25 milliGRAM(s) Oral two times a day  ferrous    sulfate 325 milliGRAM(s) Oral daily  brimonidine 0.2% Ophthalmic Solution 1 Drop(s) Both EYES two times a day  dorzolamide 2%/timolol 0.5% Ophthalmic Solution 1 Drop(s) Both EYES two times a day  levothyroxine 50 MICROGram(s) Oral daily  folic acid 1 milliGRAM(s) Oral daily  heparin  Injectable 5000 Unit(s) SubCutaneous every 12 hours  oxymorphone ER 10 milliGRAM(s) Oral every 12 hours    MEDICATIONS  (PRN):  ALPRAZolam 0.125 milliGRAM(s) Oral every 8 hours PRN anxiety  ALBUTerol/ipratropium for Nebulization. 3 milliLiter(s) Nebulizer once PRN Bronchospasm      LABS:    08-14    123<L>  |  82<L>  |  24<H>  ----------------------------<  126<H>  5.4<H>   |  28  |  0.84    Ca    9.2      14 Aug 2017 07:11                    Hemoglobin A1C, Whole Blood: 5.7 % (08-11 @ 07:12)    Thyroid Stimulating Hormone, Serum: 1.40 uIU/mL (08-10 @ 12:13)      Cultures:    EKG:    Radiological Studies:    Consultant(s) Notes Reviewed:      Care Discussed with Consultants/Other Providers:
SUBJECTIVE / OVERNIGHT EVENTS: No nausea, vomiting or diarrhea, no fever or chills, no dizziness or chest pain, no dysuria or hematuria .      CAPILLARY BLOOD GLUCOSE        I&O's Summary    14 Aug 2017 07:01  -  15 Aug 2017 07:00  --------------------------------------------------------  IN: 860 mL / OUT: 1400 mL / NET: -540 mL    15 Aug 2017 07:  -  15 Aug 2017 08:54  --------------------------------------------------------  IN: 50 mL / OUT: 0 mL / NET: 50 mL        PHYSICAL EXAM:  HEAD:  Atraumatic, Normocephalic  EYES: EOMI, PERRLA, conjunctiva and sclera clear  NECK: Supple, No JVD  CHEST/LUNG: Clear to auscultation bilaterally; No wheeze  HEART: Regular rate and rhythm; No murmurs, rubs, or gallops  ABDOMEN: Soft, Nontender, Nondistended; Bowel sounds present  EXTREMITIES:  2+ Peripheral Pulses, No clubbing, cyanosis, or edema  PSYCH: AAOx3  NEUROLOGY: non-focal  SKIN: No rashes or lesions    MEDICATIONS  (STANDING):  sulfaSALAzine 500 milliGRAM(s) Oral two times a day  latanoprost 0.005% Ophthalmic Solution 1 Drop(s) Both EYES at bedtime  buDESOnide   0.5 milliGRAM(s) Respule 0.5 milliGRAM(s) Inhalation two times a day  simvastatin 20 milliGRAM(s) Oral at bedtime  metoprolol 25 milliGRAM(s) Oral two times a day  ferrous    sulfate 325 milliGRAM(s) Oral daily  brimonidine 0.2% Ophthalmic Solution 1 Drop(s) Both EYES two times a day  dorzolamide 2%/timolol 0.5% Ophthalmic Solution 1 Drop(s) Both EYES two times a day  levothyroxine 50 MICROGram(s) Oral daily  folic acid 1 milliGRAM(s) Oral daily  heparin  Injectable 5000 Unit(s) SubCutaneous every 12 hours  oxymorphone ER 10 milliGRAM(s) Oral every 12 hours  furosemide    Tablet 20 milliGRAM(s) Oral daily  cefTRIAXone   IVPB   IV Intermittent     MEDICATIONS  (PRN):  ALPRAZolam 0.125 milliGRAM(s) Oral every 8 hours PRN anxiety  ALBUTerol/ipratropium for Nebulization. 3 milliLiter(s) Nebulizer once PRN Bronchospasm      LABS:                        11.2   9.90  )-----------( 150      ( 15 Aug 2017 07:41 )             33.6     08-14    124<L>  |  84<L>  |  27<H>  ----------------------------<  125<H>  5.3   |  31  |  0.92    Ca    8.8      14 Aug 2017 10:52            Urinalysis Basic - ( 15 Aug 2017 03:06 )    Color: Yellow / Appearance: x / S.011 / pH: x  Gluc: x / Ketone: Negative  / Bili: Negative / Urobili: Negative mg/dL   Blood: x / Protein: Negative mg/dL / Nitrite: Positive   Leuk Esterase: Large / RBC: 6 /HPF /  /HPF   Sq Epi: x / Non Sq Epi: 2 /HPF / Bacteria: Few            Hemoglobin A1C, Whole Blood: 5.7 % ( @ 07:12)    Thyroid Stimulating Hormone, Serum: 1.40 uIU/mL (08-10 @ 12:13)      Cultures:    EKG:    Radiological Studies:    Consultant(s) Notes Reviewed:      Care Discussed with Consultants/Other Providers:
SUBJECTIVE / OVERNIGHT EVENTS: No nausea, vomiting or diarrhea, no fever or chills, no dizziness or chest pain, no dysuria or hematuria .      CAPILLARY BLOOD GLUCOSE        I&O's Summary    15 Aug 2017 07:  -  16 Aug 2017 07:00  --------------------------------------------------------  IN: 950 mL / OUT: 1200 mL / NET: -250 mL    16 Aug 2017 07:  -  16 Aug 2017 08:46  --------------------------------------------------------  IN: 50 mL / OUT: 0 mL / NET: 50 mL        PHYSICAL EXAM:  HEAD:  Atraumatic, Normocephalic  EYES: EOMI, PERRLA, conjunctiva and sclera clear  NECK: Supple, No JVD  CHEST/LUNG: Clear to auscultation bilaterally; No wheeze  HEART: Regular rate and rhythm; No murmurs, rubs, or gallops  ABDOMEN: Soft, Nontender, Nondistended; Bowel sounds present  EXTREMITIES:  2+ Peripheral Pulses, No clubbing, cyanosis, or edema  PSYCH: AAOx3  NEUROLOGY: non-focal  SKIN: No rashes or lesions    MEDICATIONS  (STANDING):  sulfaSALAzine 500 milliGRAM(s) Oral two times a day  latanoprost 0.005% Ophthalmic Solution 1 Drop(s) Both EYES at bedtime  buDESOnide   0.5 milliGRAM(s) Respule 0.5 milliGRAM(s) Inhalation two times a day  simvastatin 20 milliGRAM(s) Oral at bedtime  metoprolol 25 milliGRAM(s) Oral two times a day  ferrous    sulfate 325 milliGRAM(s) Oral daily  brimonidine 0.2% Ophthalmic Solution 1 Drop(s) Both EYES two times a day  dorzolamide 2%/timolol 0.5% Ophthalmic Solution 1 Drop(s) Both EYES two times a day  levothyroxine 50 MICROGram(s) Oral daily  folic acid 1 milliGRAM(s) Oral daily  heparin  Injectable 5000 Unit(s) SubCutaneous every 12 hours  oxymorphone ER 10 milliGRAM(s) Oral every 12 hours  furosemide    Tablet 20 milliGRAM(s) Oral daily  cefTRIAXone   IVPB 1 Gram(s) IV Intermittent every 24 hours  cefTRIAXone   IVPB   IV Intermittent     MEDICATIONS  (PRN):  ALPRAZolam 0.125 milliGRAM(s) Oral every 8 hours PRN anxiety  ALBUTerol/ipratropium for Nebulization. 3 milliLiter(s) Nebulizer once PRN Bronchospasm      LABS:                        11.5   7.92  )-----------( 134      ( 16 Aug 2017 07:21 )             34.7     08-15    128<L>  |  85<L>  |  23  ----------------------------<  116<H>  4.9   |  31  |  0.66    Ca    8.4      15 Aug 2017 07:23            Urinalysis Basic - ( 15 Aug 2017 03:06 )    Color: Yellow / Appearance: x / S.011 / pH: x  Gluc: x / Ketone: Negative  / Bili: Negative / Urobili: Negative mg/dL   Blood: x / Protein: Negative mg/dL / Nitrite: Positive   Leuk Esterase: Large / RBC: 6 /HPF /  /HPF   Sq Epi: x / Non Sq Epi: 2 /HPF / Bacteria: Few            Hemoglobin A1C, Whole Blood: 5.7 % ( @ 07:12)    Thyroid Stimulating Hormone, Serum: 1.40 uIU/mL (08-10 @ 12:13)      Cultures:    EKG:    Radiological Studies:    Consultant(s) Notes Reviewed:      Care Discussed with Consultants/Other Providers:
SUBJECTIVE / OVERNIGHT EVENTS: No nausea, vomiting or diarrhea, no fever or chills, no dizziness or chest pain, no dysuria or hematuria .      CAPILLARY BLOOD GLUCOSE        I&O's Summary    16 Aug 2017 07:01  -  17 Aug 2017 07:00  --------------------------------------------------------  IN: 150 mL / OUT: 200 mL / NET: -50 mL        PHYSICAL EXAM:  HEAD:  Atraumatic, Normocephalic  EYES: EOMI, PERRLA, conjunctiva and sclera clear  NECK: Supple, No JVD  CHEST/LUNG: Clear to auscultation bilaterally; No wheeze  HEART: Regular rate and rhythm; No murmurs, rubs, or gallops  ABDOMEN: Soft, Nontender, Nondistended; Bowel sounds present  EXTREMITIES:  2+ Peripheral Pulses, No clubbing, cyanosis, or edema  PSYCH: AAOx3  NEUROLOGY: non-focal  SKIN: No rashes or lesions    MEDICATIONS  (STANDING):  sulfaSALAzine 500 milliGRAM(s) Oral two times a day  latanoprost 0.005% Ophthalmic Solution 1 Drop(s) Both EYES at bedtime  buDESOnide   0.5 milliGRAM(s) Respule 0.5 milliGRAM(s) Inhalation two times a day  simvastatin 20 milliGRAM(s) Oral at bedtime  metoprolol 25 milliGRAM(s) Oral two times a day  ferrous    sulfate 325 milliGRAM(s) Oral daily  brimonidine 0.2% Ophthalmic Solution 1 Drop(s) Both EYES two times a day  dorzolamide 2%/timolol 0.5% Ophthalmic Solution 1 Drop(s) Both EYES two times a day  levothyroxine 50 MICROGram(s) Oral daily  folic acid 1 milliGRAM(s) Oral daily  heparin  Injectable 5000 Unit(s) SubCutaneous every 12 hours  oxymorphone ER 10 milliGRAM(s) Oral every 12 hours  furosemide    Tablet 20 milliGRAM(s) Oral daily  cefTRIAXone   IVPB 1 Gram(s) IV Intermittent every 24 hours  cefTRIAXone   IVPB   IV Intermittent     MEDICATIONS  (PRN):  ALPRAZolam 0.125 milliGRAM(s) Oral every 8 hours PRN anxiety  ALBUTerol/ipratropium for Nebulization. 3 milliLiter(s) Nebulizer once PRN Bronchospasm      LABS:                        11.5   7.92  )-----------( 134      ( 16 Aug 2017 07:21 )             34.7     08-16    127<L>  |  85<L>  |  18  ----------------------------<  97  4.7   |  31  |  0.76    Ca    8.5      16 Aug 2017 07:09                    Hemoglobin A1C, Whole Blood: 5.7 % (08-11 @ 07:12)    Thyroid Stimulating Hormone, Serum: 1.40 uIU/mL (08-10 @ 12:13)      Cultures:    EKG:    Radiological Studies:    Consultant(s) Notes Reviewed:      Care Discussed with Consultants/Other Providers:

## 2017-08-17 NOTE — PROGRESS NOTE ADULT - PROVIDER SPECIALTY LIST ADULT
Cardiology
Internal Medicine
Nephrology
Internal Medicine

## 2017-10-19 PROCEDURE — 99285 EMERGENCY DEPT VISIT HI MDM: CPT | Mod: 25

## 2017-10-19 PROCEDURE — 80053 COMPREHEN METABOLIC PANEL: CPT

## 2017-10-19 PROCEDURE — 84133 ASSAY OF URINE POTASSIUM: CPT

## 2017-10-19 PROCEDURE — 82570 ASSAY OF URINE CREATININE: CPT

## 2017-10-19 PROCEDURE — 85027 COMPLETE CBC AUTOMATED: CPT

## 2017-10-19 PROCEDURE — 70450 CT HEAD/BRAIN W/O DYE: CPT

## 2017-10-19 PROCEDURE — 82436 ASSAY OF URINE CHLORIDE: CPT

## 2017-10-19 PROCEDURE — 84550 ASSAY OF BLOOD/URIC ACID: CPT

## 2017-10-19 PROCEDURE — 83930 ASSAY OF BLOOD OSMOLALITY: CPT

## 2017-10-19 PROCEDURE — 80048 BASIC METABOLIC PNL TOTAL CA: CPT

## 2017-10-19 PROCEDURE — 97116 GAIT TRAINING THERAPY: CPT

## 2017-10-19 PROCEDURE — 82533 TOTAL CORTISOL: CPT

## 2017-10-19 PROCEDURE — 85610 PROTHROMBIN TIME: CPT

## 2017-10-19 PROCEDURE — 81001 URINALYSIS AUTO W/SCOPE: CPT

## 2017-10-19 PROCEDURE — 94640 AIRWAY INHALATION TREATMENT: CPT

## 2017-10-19 PROCEDURE — 84300 ASSAY OF URINE SODIUM: CPT

## 2017-10-19 PROCEDURE — 71045 X-RAY EXAM CHEST 1 VIEW: CPT

## 2017-10-19 PROCEDURE — 85049 AUTOMATED PLATELET COUNT: CPT

## 2017-10-19 PROCEDURE — 97110 THERAPEUTIC EXERCISES: CPT

## 2017-10-19 PROCEDURE — 83036 HEMOGLOBIN GLYCOSYLATED A1C: CPT

## 2017-10-19 PROCEDURE — 83935 ASSAY OF URINE OSMOLALITY: CPT

## 2017-10-19 PROCEDURE — 84484 ASSAY OF TROPONIN QUANT: CPT

## 2017-10-19 PROCEDURE — 83880 ASSAY OF NATRIURETIC PEPTIDE: CPT

## 2017-10-19 PROCEDURE — 84443 ASSAY THYROID STIM HORMONE: CPT

## 2017-10-19 PROCEDURE — 85730 THROMBOPLASTIN TIME PARTIAL: CPT

## 2017-10-19 PROCEDURE — 87086 URINE CULTURE/COLONY COUNT: CPT

## 2017-10-19 PROCEDURE — 97162 PT EVAL MOD COMPLEX 30 MIN: CPT

## 2022-06-08 NOTE — PATIENT PROFILE ADULT. - URINARY CATHETER
no Perilesional Excision Additional Text (Leave Blank If You Do Not Want): The margin was drawn around the clinically apparent lesion. Incisions were then made along these lines to the appropriate tissue plane and the lesion was extirpated.